# Patient Record
Sex: MALE | Race: WHITE | NOT HISPANIC OR LATINO | URBAN - METROPOLITAN AREA
[De-identification: names, ages, dates, MRNs, and addresses within clinical notes are randomized per-mention and may not be internally consistent; named-entity substitution may affect disease eponyms.]

---

## 2020-09-30 ENCOUNTER — EMERGENCY (EMERGENCY)
Facility: HOSPITAL | Age: 65
LOS: 1 days | Discharge: HOME | End: 2020-09-30
Attending: EMERGENCY MEDICINE
Payer: MEDICARE

## 2020-09-30 VITALS
TEMPERATURE: 98 F | HEIGHT: 65.75 IN | HEART RATE: 60 BPM | WEIGHT: 163.14 LBS | OXYGEN SATURATION: 97 % | SYSTOLIC BLOOD PRESSURE: 155 MMHG | DIASTOLIC BLOOD PRESSURE: 65 MMHG | RESPIRATION RATE: 18 BRPM

## 2020-09-30 PROCEDURE — 99284 EMERGENCY DEPT VISIT MOD MDM: CPT

## 2020-09-30 PROCEDURE — 93971 EXTREMITY STUDY: CPT

## 2020-09-30 PROCEDURE — 99284 EMERGENCY DEPT VISIT MOD MDM: CPT | Mod: 25

## 2020-09-30 PROCEDURE — 93971 EXTREMITY STUDY: CPT | Mod: 26,RT

## 2020-09-30 RX ORDER — IBUPROFEN 200 MG
400 TABLET ORAL ONCE
Refills: 0 | Status: COMPLETED | OUTPATIENT
Start: 2020-09-30 | End: 2020-09-30

## 2020-09-30 RX ADMIN — Medication 400 MILLIGRAM(S): at 19:20

## 2020-09-30 RX ADMIN — Medication 400 MILLIGRAM(S): at 20:00

## 2020-09-30 NOTE — ED PROVIDER NOTE - CARE PROVIDER_API CALL
Ricky Ortiz  ORTHOPAEDIC SURGERY  [address] [facility], 1st [place] [address]  Phone: [phone]  Fax: [phone]  Follow Up Time: 4-6 Days

## 2020-09-30 NOTE — ED PROVIDER NOTE - CARE PROVIDERS DIRECT ADDRESSES
[email or web address]

## 2020-09-30 NOTE — ED PROVIDER NOTE - CLINICAL SUMMARY MEDICAL DECISION MAKING FREE TEXT BOX
65M with R leg pain and swelling. Xray at  today negative. Ultrasound r/o DVT. If negative, pt to f/u with ortho.

## 2020-09-30 NOTE — ED ADULT NURSE NOTE - OBJECTIVE STATEMENT
Pt Sent from urgent care with right leg pain, swelling and cold sensation x yesterday, r/o DVT. No acute distress noted, denies chest pain, no shortness of breath indicated. Safety maintained.

## 2020-09-30 NOTE — ED PROVIDER NOTE - NSFOLLOWUPINSTRUCTIONS_ED_ALL_ED_FT
IMPORTANT INSTRUCTIONS FROM Dr. MARTINEZ:    Please follow up with your personal medical doctor in 24-48 hours.   Bring results from today to your visit.    See orthopedics within 1 wk.    If you were advised to take any medications - be sure to review the package insert.    If your symptoms change, get worse or if you have any new symptoms, come to the ER right away.  If you have any questions, call the ER at the phone number on this page.

## 2020-09-30 NOTE — ED PROVIDER NOTE - PATIENT PORTAL LINK FT
You can access the FollowMyHealth Patient Portal offered by [facility] by registering at the following website: [email or web address]. By joining [employer]’s FollowMyHealth portal, you will also be able to view your health information using other applications (apps) compatible with our system.

## 2020-09-30 NOTE — ED PROVIDER NOTE - OBJECTIVE STATEMENT
64 y/o male with PMHx of HTN, BPH presents to the ED c/o R leg pain x yesterday. Pt notes R leg pain/swelling and cold sensation, radiating down to his R ankle where pt has been unable to move about the R ankle or walk secondary to pain. Pt was seen at  x today, xray negative, referred to ED for eval r/o DVT. Pt recently finished a regimen of steroids x yesterday. Pt currently on Toprol, Synthroid and Flomax. Pt denies chest pain, shortness of breath, or any other complaints. Pt currently smokes. NKDA.

## 2020-09-30 NOTE — ED PROVIDER NOTE - PMH
BPH (Benign Prostatic Hypertrophy)    HTN (Hypertension)  has been high on recent doctor visits, never started on meds

## 2023-04-03 ENCOUNTER — OUTPATIENT (OUTPATIENT)
Dept: OUTPATIENT SERVICES | Facility: HOSPITAL | Age: 68
LOS: 1 days | End: 2023-04-03
Payer: MEDICARE

## 2023-04-03 ENCOUNTER — TRANSCRIPTION ENCOUNTER (OUTPATIENT)
Age: 68
End: 2023-04-03

## 2023-04-03 VITALS — WEIGHT: 162.04 LBS | HEIGHT: 68.5 IN

## 2023-04-03 VITALS
SYSTOLIC BLOOD PRESSURE: 170 MMHG | RESPIRATION RATE: 19 BRPM | OXYGEN SATURATION: 97 % | HEART RATE: 70 BPM | DIASTOLIC BLOOD PRESSURE: 72 MMHG

## 2023-04-03 DIAGNOSIS — R93.1 ABNORMAL FINDINGS ON DIAGNOSTIC IMAGING OF HEART AND CORONARY CIRCULATION: ICD-10-CM

## 2023-04-03 LAB
ANION GAP SERPL CALC-SCNC: 12 MMOL/L — SIGNIFICANT CHANGE UP (ref 5–17)
BUN SERPL-MCNC: 14 MG/DL — SIGNIFICANT CHANGE UP (ref 7–23)
CALCIUM SERPL-MCNC: 9.3 MG/DL — SIGNIFICANT CHANGE UP (ref 8.4–10.5)
CHLORIDE SERPL-SCNC: 104 MMOL/L — SIGNIFICANT CHANGE UP (ref 96–108)
CO2 SERPL-SCNC: 25 MMOL/L — SIGNIFICANT CHANGE UP (ref 22–31)
CREAT SERPL-MCNC: 0.79 MG/DL — SIGNIFICANT CHANGE UP (ref 0.5–1.3)
EGFR: 96 ML/MIN/1.73M2 — SIGNIFICANT CHANGE UP
GLUCOSE SERPL-MCNC: 97 MG/DL — SIGNIFICANT CHANGE UP (ref 70–99)
HCT VFR BLD CALC: 41.9 % — SIGNIFICANT CHANGE UP (ref 39–50)
HGB BLD-MCNC: 14.8 G/DL — SIGNIFICANT CHANGE UP (ref 13–17)
HGB BLDA-MCNC: 13.8 G/DL — SIGNIFICANT CHANGE UP (ref 12.6–17.4)
MCHC RBC-ENTMCNC: 30.5 PG — SIGNIFICANT CHANGE UP (ref 27–34)
MCHC RBC-ENTMCNC: 35.3 GM/DL — SIGNIFICANT CHANGE UP (ref 32–36)
MCV RBC AUTO: 86.2 FL — SIGNIFICANT CHANGE UP (ref 80–100)
NRBC # BLD: 0 /100 WBCS — SIGNIFICANT CHANGE UP (ref 0–0)
OXYHGB MFR BLDA: 93.8 % — SIGNIFICANT CHANGE UP (ref 90–95)
PLATELET # BLD AUTO: 91 K/UL — LOW (ref 150–400)
POTASSIUM SERPL-MCNC: 4.1 MMOL/L — SIGNIFICANT CHANGE UP (ref 3.5–5.3)
POTASSIUM SERPL-SCNC: 4.1 MMOL/L — SIGNIFICANT CHANGE UP (ref 3.5–5.3)
RBC # BLD: 4.86 M/UL — SIGNIFICANT CHANGE UP (ref 4.2–5.8)
RBC # FLD: 12.5 % — SIGNIFICANT CHANGE UP (ref 10.3–14.5)
SAO2 % BLDA: 96.2 % — SIGNIFICANT CHANGE UP (ref 94–98)
SODIUM SERPL-SCNC: 141 MMOL/L — SIGNIFICANT CHANGE UP (ref 135–145)
WBC # BLD: 4.63 K/UL — SIGNIFICANT CHANGE UP (ref 3.8–10.5)
WBC # FLD AUTO: 4.63 K/UL — SIGNIFICANT CHANGE UP (ref 3.8–10.5)

## 2023-04-03 PROCEDURE — C1889: CPT

## 2023-04-03 PROCEDURE — 82803 BLOOD GASES ANY COMBINATION: CPT

## 2023-04-03 PROCEDURE — 93456 R HRT CORONARY ARTERY ANGIO: CPT

## 2023-04-03 PROCEDURE — C1894: CPT

## 2023-04-03 PROCEDURE — 80048 BASIC METABOLIC PNL TOTAL CA: CPT

## 2023-04-03 PROCEDURE — 93010 ELECTROCARDIOGRAM REPORT: CPT

## 2023-04-03 PROCEDURE — 99152 MOD SED SAME PHYS/QHP 5/>YRS: CPT

## 2023-04-03 PROCEDURE — C1769: CPT

## 2023-04-03 PROCEDURE — 85027 COMPLETE CBC AUTOMATED: CPT

## 2023-04-03 PROCEDURE — 93456 R HRT CORONARY ARTERY ANGIO: CPT | Mod: 26

## 2023-04-03 PROCEDURE — C1887: CPT

## 2023-04-03 PROCEDURE — 36415 COLL VENOUS BLD VENIPUNCTURE: CPT

## 2023-04-03 PROCEDURE — 93005 ELECTROCARDIOGRAM TRACING: CPT

## 2023-04-03 RX ORDER — TAMSULOSIN HYDROCHLORIDE 0.4 MG/1
2 CAPSULE ORAL
Refills: 0 | DISCHARGE

## 2023-04-03 RX ORDER — METOPROLOL TARTRATE 50 MG
1 TABLET ORAL
Refills: 0 | DISCHARGE

## 2023-04-03 RX ORDER — LEVOTHYROXINE SODIUM 125 MCG
1 TABLET ORAL
Refills: 0 | DISCHARGE

## 2023-04-03 RX ORDER — SODIUM CHLORIDE 9 MG/ML
1000 INJECTION INTRAMUSCULAR; INTRAVENOUS; SUBCUTANEOUS
Refills: 0 | Status: DISCONTINUED | OUTPATIENT
Start: 2023-04-03 | End: 2023-04-17

## 2023-04-03 RX ORDER — SODIUM CHLORIDE 9 MG/ML
250 INJECTION INTRAMUSCULAR; INTRAVENOUS; SUBCUTANEOUS ONCE
Refills: 0 | Status: COMPLETED | OUTPATIENT
Start: 2023-04-03 | End: 2023-04-03

## 2023-04-03 RX ADMIN — SODIUM CHLORIDE 75 MILLILITER(S): 9 INJECTION INTRAMUSCULAR; INTRAVENOUS; SUBCUTANEOUS at 10:00

## 2023-04-03 RX ADMIN — SODIUM CHLORIDE 750 MILLILITER(S): 9 INJECTION INTRAMUSCULAR; INTRAVENOUS; SUBCUTANEOUS at 09:30

## 2023-04-03 NOTE — ASU PATIENT PROFILE, ADULT - FALL HARM RISK - HARM RISK INTERVENTIONS

## 2023-04-03 NOTE — ASU DISCHARGE PLAN (ADULT/PEDIATRIC) - NS MD DC FALL RISK RISK
Pt refusing IV at this time      Jair Chris, FALLON  05/03/21 2055 For information on Fall & Injury Prevention, visit: [email or web address] OR  [email or web address] OR  https://www.cdc.gov/steadi/patient.html

## 2023-04-03 NOTE — H&P CARDIOLOGY - HISTORY OF PRESENT ILLNESS
55 year old male former smoker with BPH, back pain who presented to [facility] ED 3days ago complaining of intermittent chest pain and heart fluttering with "difficulty catching my breath" for the past 1 1/2 weeks. Patient R/O MI, underwent a CT scan with contrast revealing "a blockage in my heart".  Patient was discharged to home and follow up with a Cardiologist for which he underwent a Stress test with abnormal results. Admits to increase in fatigue and decrease inexcercise tolerance. Denies dizziness, syncope, edema, orthopnea, PND.   In light of patients symptoms and abnormal noninvasive test findings there is high suspicion for CAD. Patient is now referred to [facility] for cardiac catheterization with possible PTCA/stents.    Denies hyperlipidemia, diabetes, CVA/MI, seizure disorder, ICD/PPM, valvular disease, thyroid/ liver/ kidney/ lung problems, bleeding disorders, PUD, anemia, DVT/pulmonary embolism, glaucoma, Cancer 68 year old male former smoker with PMHx of Hypothyroid, HTN, BPH, back pain who presents today for LHC. Patient states he has ongoing JALLOH after he recovered from COVID-19, was seen by cardiologist, Dr.Riegel ALLEN, who did a NST which was abnormal, thus referred for LHC. Pt denies any implanted cardiac device. Denies chest pain, palpitations syncope.   68 year old male former smoker with PMHx of Hypothyroid, HTN, BPH, back pain who presents today for LHC. Patient states he has ongoing JALLOH after he recovered from COVID-19, was seen by cardiologist, Dr.Riegel ALLEN, who did a NST which was abnormal, thus referred for RHC +LHC. Pt denies any implanted cardiac device. Denies chest pain, palpitations syncope.   68 year old male former smoker with PMHx of Hypothyroid, HTN, BPH, Aortic aneurysm, who presents today for LHC. Patient states he has ongoing JALLOH after he recovered from COVID-19, was seen by cardiologist, Dr.Riegel ALLEN, who did a NST which was abnormal, thus referred for RHC +LHC. Pt denies any implanted cardiac device. Denies chest pain, palpitations syncope.   68 year old male former smoker with PMHx of Hypothyroid, HTN, BPH, Aortic aneurysm, who presents today for RHC/LHC. Patient states he has ongoing JALLOH after he recovered from COVID-19, was seen by cardiologist, Dr.Riegel ALLEN, who did a NST which was abnormal ( does ot have report in [employer]), thus referred for RHC +LHC. Pt denies any implanted cardiac device. Denies chest pain, palpitations syncope.

## 2023-04-03 NOTE — ASU DISCHARGE PLAN (ADULT/PEDIATRIC) - ASU DC SPECIAL INSTRUCTIONSFT
Wound Care:   the day AFTER your procedure remove bandage GENTLY, and clean using  mild soap and gentle warm, water stream, pat dry. leave OPEN to air. YOU MAY SHOWER   DO NOT apply lotions, creams, ointments, powder, perfumes to your incision site  DO NOT SOAK your site for 1 week ( no baths, no pools, no tubs, etc...)  Check  your groin and /or wrist daily. A small amount of bruising, and soreness are normal    ACTIVITY: for 24 hours   - DO NOT DRIVE  - DO NOT make any important decisions or sign legal documents   - DO NOT operate heavy machineries   - you may resume sexual activity in 48 hours, unless otherwise instructed by your cardiologist     Your procedure was done through the GR[facility]: for the NEXT 5 DAYS  - Limit climbing stairs, DO NOT soak in bathtub or pool  - no strenuous activities, pushing, pulling, straining  - Do not lift anything 10lbs or heavier     MEDICATION:   take your medications as explained ( see discharge paperwork)   If you received a STENT, you will be taking antiplatelet medications to KEEP YOUR STENT OPEN ( eg: Aspirin, Plavix, Brilinta, Effient, etc).  Take as prescribed DO NOT STOP taking them without consulting with your cardiologist first.     Follow heart healthy diet recommended by your doctor, if you smoke STOP SMOKING ( may call [phone] for center of tobacco control if you need assistance)     CALL your doctor to make appointment in 2 WEEKS     ***CALL YOUR DOCTOR***  if you experience: fever, chills, body aches, or severe pain, swelling, redness, heat or yellow discharge at incision site  If you experience bleeding or excruciating pain at the procedural site, swelling ( golf ball size) at your procedural site  If you experience CHEST PAIN  If you experience extremity numbness, tingling, temperature change ( of your procedural site)   If you are unable to reach your doctor, you may contact:   -Cardiology Office at [facility] at [phone] or   - [facility] [phone]  - [facility] [phone]

## 2023-04-03 NOTE — ASU DISCHARGE PLAN (ADULT/PEDIATRIC) - CARE PROVIDER_API CALL
Robbie Allred J  CARDIOVASCULAR DISEASE  [address] [place] [address]  Phone: [phone]  Fax: [phone]  Follow Up Time: 2 weeks

## 2023-04-03 NOTE — H&P CARDIOLOGY - NSICDXPASTSURGICALHX_GEN_ALL_CORE_FT
PAST SURGICAL HISTORY:  History of Back Surgery lumbar    History of Urethral Stricture     S/P Inguinal Hernia Repair     Total Hip Replacement right

## 2023-04-05 ENCOUNTER — APPOINTMENT (OUTPATIENT)
Dept: PULMONOLOGY | Facility: CLINIC | Age: 68
End: 2023-04-05
Payer: MEDICARE

## 2023-04-05 VITALS — SYSTOLIC BLOOD PRESSURE: 202 MMHG | DIASTOLIC BLOOD PRESSURE: 80 MMHG | OXYGEN SATURATION: 98 % | HEART RATE: 58 BPM

## 2023-04-05 VITALS — HEIGHT: 68 IN | BODY MASS INDEX: 24.55 KG/M2 | WEIGHT: 162 LBS

## 2023-04-05 VITALS — DIASTOLIC BLOOD PRESSURE: 78 MMHG | SYSTOLIC BLOOD PRESSURE: 160 MMHG

## 2023-04-05 VITALS — TEMPERATURE: 97.9 F

## 2023-04-05 DIAGNOSIS — Z86.79 PERSONAL HISTORY OF OTHER DISEASES OF THE CIRCULATORY SYSTEM: ICD-10-CM

## 2023-04-05 DIAGNOSIS — R06.83 SNORING: ICD-10-CM

## 2023-04-05 DIAGNOSIS — G47.19 OTHER HYPERSOMNIA: ICD-10-CM

## 2023-04-05 DIAGNOSIS — E04.1 NONTOXIC SINGLE THYROID NODULE: ICD-10-CM

## 2023-04-05 DIAGNOSIS — Z87.891 PERSONAL HISTORY OF NICOTINE DEPENDENCE: ICD-10-CM

## 2023-04-05 PROBLEM — N40.0 BENIGN PROSTATIC HYPERPLASIA WITHOUT LOWER URINARY TRACT SYMPTOMS: Chronic | Status: ACTIVE | Noted: 2023-04-03

## 2023-04-05 PROBLEM — E03.9 HYPOTHYROIDISM, UNSPECIFIED: Chronic | Status: ACTIVE | Noted: 2023-04-03

## 2023-04-05 PROCEDURE — 94729 DIFFUSING CAPACITY: CPT

## 2023-04-05 PROCEDURE — ZZZZZ: CPT

## 2023-04-05 PROCEDURE — 94727 GAS DIL/WSHOT DETER LNG VOL: CPT

## 2023-04-05 PROCEDURE — 94010 BREATHING CAPACITY TEST: CPT

## 2023-04-05 PROCEDURE — 99205 OFFICE O/P NEW HI 60 MIN: CPT | Mod: 25

## 2023-04-05 RX ORDER — FLUTICASONE FUROATE 27.5 UG/1
27.5 SPRAY, METERED NASAL
Refills: 0 | Status: ACTIVE | COMMUNITY
Start: 2023-04-05

## 2023-04-05 RX ORDER — METOPROLOL SUCCINATE 100 MG/1
100 TABLET, EXTENDED RELEASE ORAL
Refills: 0 | Status: ACTIVE | COMMUNITY
Start: 2023-04-05

## 2023-04-05 RX ORDER — LEVOTHYROXINE SODIUM 0.07 MG/1
75 TABLET ORAL
Refills: 0 | Status: ACTIVE | COMMUNITY
Start: 2023-04-05

## 2023-04-05 RX ORDER — LOSARTAN POTASSIUM 50 MG/1
50 TABLET, FILM COATED ORAL
Refills: 0 | Status: ACTIVE | COMMUNITY
Start: 2023-04-05

## 2023-04-06 NOTE — REASON FOR VISIT
[Abnormal CXR/ Chest CT] : an abnormal CXR/ chest CT [Consultation] : a consultation [TextBox_44] : snoring, excessive daytime sleepiness

## 2023-04-06 NOTE — CONSULT LETTER
[Dear  ___] : Dear ~SAMUEL, [Consult Letter:] : I had the pleasure of evaluating your patient, [unfilled]. [Consult Closing:] : Thank you very much for allowing me to participate in the care of this patient.  If you have any questions, please do not hesitate to contact me. [Sincerely,] : Sincerely, [FreeTextEntry2] : Robbie Allred MD\par  [FreeTextEntry3] : Nicanor Izaguirre MD FCCP\par \par

## 2023-04-06 NOTE — DISCUSSION/SUMMARY
[FreeTextEntry1] : Rule out obstructive sleep apnea syndrome.\par Pulmonary fibrosis.  Mild.  Most likely related to COVID.  Cannot exclude other etiologies but no clinical evidence of rheumatic disease.  No honeycombing noted.  Does not appear to be progressive.\par Multiple pulmonary nodules stable.

## 2023-04-06 NOTE — HISTORY OF PRESENT ILLNESS
[Former] : former [< 20 pack-years] : < 20 pack-years [Daytime Somnolence] : daytime somnolence [Difficulty Maintaining Sleep] : difficulty maintaining sleep [Frequent Nocturnal Awakening] : frequent nocturnal awakening [Snoring] : snoring [TextBox_4] : ANIKET COHEN is a 68 year old  M referred for pulmonary evaluation for abnormal CT. \par \par Had COVID 2020 was very ill. Not hospitalized. Positive SOB. \par Since still some SOB,\par Told some lung damage from COVID. Told by PMD.\par Sometimes SOB lying down, sometimes after cooking. Feels needs to take deep breath.\par Positive JALLOH 3-4 flights of stairs.\par Feels mild JALLOH.\par No significant cough or wheezing\par No CP. \par \par Had recent cardiac cath told OK. \par \par Past pulmonary history. COVID Pneumonia. Pneumonia out pt in 1990's\par Occupational Exposure. N\par Family history of pulmonary disease. lung cancer parents. Smokers\par Recent travel N\par Pets N\par \par \par HAving issues with hypertension to start losartan 50 mg added on today [TextBox_11] : 1 [TextBox_13] : 10 [YearQuit] : 1980 [Recent  Weight Gain] : no recent weight gain

## 2023-04-06 NOTE — PROCEDURE
[FreeTextEntry1] : [facility]\par \par 04/05/2023\par Pulmonary function testing\par These data demonstrate a mild obstructive ventilatory deficit. Normal Lung Volumes. There is a mild-moderate diffusion impairment. \par \par CAT scan of the chest on March 3, 2023 noted reviewed and discussed with patient.  Compared to multiple prior studies.\par Stable pulmonary nodules.\par Mild aneurysmal dilatation of the aorta.\par Bilateral interstitial changes without significant change compared to prior CT of October 2020.\par Interstitial changes and not present on CAT scan many years prior.\par No significant adenopathy.\par \par \par \par  / 1 Shanti Kaplan   \par  \par \par \par \par Report date: 12/24/2013 \par  \par  View Order\par \par \par (Report matches study selected on Patient History pane)\par \par \par   \par \par \par EXAM: CT CHEST WO CON \par PROCEDURE DATE: Dec 24 2013 \par INTERPRETATION: CTA of the Chest, Abdomen : 12/24/2013. \par Procedure : CTA was performed from the apices to the aid abdomen following \par dynamic bolus administration of intravenous contrast utilizing 100 cc of \par Omnipaque 350. Noncontrast images were first obtained through the thoracic \par inlet to the iliac crest. Sagittal and coronal reformatted images were \par obtained. \par Clinical Indication : Follow-up pulmonary nodule. \par Comparison Exam : CT of the chest dated 10/11/2012. \par FINDINGS: \par Mediastinum: No evidence of adenopathy or mass. \par Hilum: No evidence of adenopathy or mass. \par Axilla: Negative bilaterally. \par Pleura: No effusion. \par Pericardium: No effusion. \par Lungs: No evidence of infiltrate. There is a new 6 x 4 mm nodule of the \par left lower lobe is seen on series 5 image #65. \par Liver/Biliary: No evidence of bile duct dilatation. The gallbladder is \par unremarkable. No hepatic mass. \par Spleen: Normal in size and appearance. \par Pancreas: No evidence of mass or collections. \par Adrenal Glands: No masses. \par Right Kidney: Normal in size. No evidence of solid renal mass or \par hydronephrosis. \par Left Kidney: Normal in size. No evidence of solid renal mass or \par hydronephrosis. \par Aorta: No evidence of aneurysm or dissection. The proximal ascending aorta \par measures 4.1 cm. The mid ascending aorta measures 4.2 x 4.1 cm, at the \par thoracic aortic arch the aorta maximally measures 3 cm, and the proximal \par descending thoracic aorta measures 2.7 cm. The distal thoracic aorta and \par upper abdominal aorta are unremarkable. The celiac axis, superior mesenteric \par artery and both renal arteries demonstrate patency. \par Retroperitoneum: No adenopathy. \par Gastrointestinal Structures: No bowel related phlegmon or abscess. No \par obstruction. \par Bones: No destructive bony process. Postoperative changes of the lower \par lumbar spine. \par Miscellaneous: None. \par IMPRESSION: New 6 x 4 mm nodule of the left lower lobe of lung. Suggest \par four-month CT follow-up. \par The ascending thoracic aorta maximumally measures 4.2 x 4.1 cm and is stable. \par SHANTI KAPLAN M.D., ATTENDING RADIOLOGIST \par This examination was interpreted on:  {orig_read_dtime } This document has \par been electronically signed. Dec 24 2013 12:25PM \par \par \par    \par \par \par \par \par \par \par \par \par \par \par \par \par \par \par   \par  \par  \par \par \par Notes \par   \par  \par   \par \par \par \par \par \par \par \par \par \par \par  \par \par \par  \par \par \par \par \par \par \par \par \par \par \par \par \par \par \par \par \par \par \par \par \par \par \par \par \par     \par \par

## 2023-05-01 ENCOUNTER — APPOINTMENT (OUTPATIENT)
Dept: PULMONOLOGY | Facility: CLINIC | Age: 68
End: 2023-05-01

## 2023-06-22 NOTE — ASSESSMENT
[FreeTextEntry1] : 2 night HHS ordered\par We will follow-up regarding interstitial lung disease and pulmonary nodules.\par Follow-up CT in 1 year.  Follow-up sooner should there be a change in status.\par Clinical reevaluation in 6 months.\par Discussed with patient and son.\par \par 80 minutes spent in evaluation management and review of studies as well as discussion.
no

## 2023-09-27 ENCOUNTER — APPOINTMENT (OUTPATIENT)
Dept: PULMONOLOGY | Facility: CLINIC | Age: 68
End: 2023-09-27

## 2023-10-27 ENCOUNTER — APPOINTMENT (OUTPATIENT)
Dept: GASTROENTEROLOGY | Facility: CLINIC | Age: 68
End: 2023-10-27
Payer: MEDICARE

## 2023-10-27 VITALS
DIASTOLIC BLOOD PRESSURE: 78 MMHG | HEART RATE: 88 BPM | OXYGEN SATURATION: 98 % | SYSTOLIC BLOOD PRESSURE: 166 MMHG | HEIGHT: 68 IN

## 2023-10-27 DIAGNOSIS — R14.1 GAS PAIN: ICD-10-CM

## 2023-10-27 DIAGNOSIS — R19.4 CHANGE IN BOWEL HABIT: ICD-10-CM

## 2023-10-27 DIAGNOSIS — Z12.11 ENCOUNTER FOR SCREENING FOR MALIGNANT NEOPLASM OF COLON: ICD-10-CM

## 2023-10-27 PROCEDURE — 99204 OFFICE O/P NEW MOD 45 MIN: CPT

## 2023-10-27 RX ORDER — SODIUM SULFATE, POTASSIUM SULFATE AND MAGNESIUM SULFATE 1.6; 3.13; 17.5 G/177ML; G/177ML; G/177ML
17.5-3.13-1.6 SOLUTION ORAL
Qty: 1 | Refills: 0 | Status: ACTIVE | COMMUNITY
Start: 2023-10-27 | End: 1900-01-01

## 2023-10-27 RX ORDER — TANNIC/CHESTNUT/PEPPERMINT 275 MG
CAPSULE ORAL
Qty: 30 | Refills: 0 | Status: ACTIVE | COMMUNITY
Start: 2023-10-27 | End: 1900-01-01

## 2023-10-27 RX ORDER — NIFEDIPINE 30 MG
30 TABLET, EXTENDED RELEASE ORAL
Refills: 0 | Status: ACTIVE | COMMUNITY

## 2023-10-27 RX ORDER — LEVOTHYROXINE SODIUM 137 UG/1
TABLET ORAL
Refills: 0 | Status: ACTIVE | COMMUNITY

## 2023-11-01 LAB — HEMOCCULT STL QL IA: NEGATIVE

## 2023-12-12 ENCOUNTER — APPOINTMENT (OUTPATIENT)
Dept: ORTHOPEDIC SURGERY | Facility: CLINIC | Age: 68
End: 2023-12-12
Payer: MEDICARE

## 2023-12-12 VITALS — WEIGHT: 164 LBS | HEIGHT: 68 IN | BODY MASS INDEX: 24.86 KG/M2

## 2023-12-12 DIAGNOSIS — M11.262 OTHER CHONDROCALCINOSIS, LEFT KNEE: ICD-10-CM

## 2023-12-12 DIAGNOSIS — M70.62 TROCHANTERIC BURSITIS, LEFT HIP: ICD-10-CM

## 2023-12-12 DIAGNOSIS — Z96.641 PRESENCE OF RIGHT ARTIFICIAL HIP JOINT: ICD-10-CM

## 2023-12-12 DIAGNOSIS — M16.12 UNILATERAL PRIMARY OSTEOARTHRITIS, LEFT HIP: ICD-10-CM

## 2023-12-12 DIAGNOSIS — M11.261 OTHER CHONDROCALCINOSIS, RIGHT KNEE: ICD-10-CM

## 2023-12-12 DIAGNOSIS — M25.552 PAIN IN LEFT HIP: ICD-10-CM

## 2023-12-12 PROCEDURE — 20610 DRAIN/INJ JOINT/BURSA W/O US: CPT | Mod: 59,LT

## 2023-12-12 PROCEDURE — 73522 X-RAY EXAM HIPS BI 3-4 VIEWS: CPT

## 2023-12-12 PROCEDURE — 99204 OFFICE O/P NEW MOD 45 MIN: CPT | Mod: 25

## 2023-12-12 RX ORDER — CELECOXIB 200 MG/1
200 CAPSULE ORAL DAILY
Qty: 60 | Refills: 3 | Status: ACTIVE | COMMUNITY
Start: 2023-12-12 | End: 1900-01-01

## 2024-02-26 ENCOUNTER — RX RENEWAL (OUTPATIENT)
Age: 69
End: 2024-02-26

## 2024-02-26 RX ORDER — OMEPRAZOLE 20 MG/1
20 CAPSULE, DELAYED RELEASE ORAL DAILY
Qty: 30 | Refills: 3 | Status: ACTIVE | COMMUNITY
Start: 2023-10-27 | End: 1900-01-01

## 2024-03-04 ENCOUNTER — APPOINTMENT (OUTPATIENT)
Dept: GASTROENTEROLOGY | Facility: AMBULATORY MEDICAL SERVICES | Age: 69
End: 2024-03-04

## 2024-03-12 ENCOUNTER — APPOINTMENT (OUTPATIENT)
Dept: ORTHOPEDIC SURGERY | Facility: CLINIC | Age: 69
End: 2024-03-12

## 2024-05-01 ENCOUNTER — APPOINTMENT (OUTPATIENT)
Dept: PULMONOLOGY | Facility: CLINIC | Age: 69
End: 2024-05-01
Payer: MEDICARE

## 2024-05-01 VITALS
WEIGHT: 165 LBS | HEIGHT: 68 IN | OXYGEN SATURATION: 98 % | TEMPERATURE: 98.3 F | BODY MASS INDEX: 25.01 KG/M2 | HEART RATE: 66 BPM | DIASTOLIC BLOOD PRESSURE: 55 MMHG | SYSTOLIC BLOOD PRESSURE: 131 MMHG

## 2024-05-01 DIAGNOSIS — R06.02 SHORTNESS OF BREATH: ICD-10-CM

## 2024-05-01 DIAGNOSIS — J84.9 INTERSTITIAL PULMONARY DISEASE, UNSPECIFIED: ICD-10-CM

## 2024-05-01 DIAGNOSIS — R93.89 ABNORMAL FINDINGS ON DIAGNOSTIC IMAGING OF OTHER SPECIFIED BODY STRUCTURES: ICD-10-CM

## 2024-05-01 PROCEDURE — 99214 OFFICE O/P EST MOD 30 MIN: CPT | Mod: 25

## 2024-05-01 PROCEDURE — 94729 DIFFUSING CAPACITY: CPT

## 2024-05-01 PROCEDURE — 94727 GAS DIL/WSHOT DETER LNG VOL: CPT

## 2024-05-01 PROCEDURE — 94010 BREATHING CAPACITY TEST: CPT

## 2024-05-01 PROCEDURE — ZZZZZ: CPT

## 2024-05-01 PROCEDURE — 85018 HEMOGLOBIN: CPT | Mod: QW

## 2024-05-01 NOTE — PROCEDURE
[FreeTextEntry1] : 05/01/2024 Pulmonary function testing These data demonstrate a mild obstructive ventilatory deficit. There is a mild reduction in lung volume. There is a moderate diffusion impairment. Corrects to mild with lung volume correction  No significant change in function compared to April 5, 2023.   CAT scan of the chest on April 30, 2024. Reviewed discussed and compared to prior. Mild reticular opacities without significant change.  Small stable pulmonary nodules.  Resolved right upper lobe groundglass opacities present on CT of 2/29/2024 resolved. No adenopathy.

## 2024-05-01 NOTE — DISCUSSION/SUMMARY
[FreeTextEntry1] : Small areas of groundglass opacity right upper lobe resolves appear to have been infectious inflammatory in origin. Rule out obstructive sleep apnea syndrome. Pulmonary fibrosis.  Mild.  Most likely related to COVID.  Cannot exclude other etiologies but no clinical evidence of rheumatic disease.  No honeycombing noted.  Functionally and radiographically stable. Multiple pulmonary nodules stable.

## 2024-05-01 NOTE — REASON FOR VISIT
[Follow-Up] : a follow-up visit [Abnormal CXR/ Chest CT] : an abnormal CXR/ chest CT [TextBox_44] : snoring, excessive daytime sleepiness

## 2024-05-01 NOTE — HISTORY OF PRESENT ILLNESS
[Former] : former [< 20 pack-years] : < 20 pack-years [Daytime Somnolence] : daytime somnolence [Difficulty Maintaining Sleep] : difficulty maintaining sleep [Frequent Nocturnal Awakening] : frequent nocturnal awakening [Snoring] : snoring [TextBox_4] : Here for f/u  brought in CT chest in April Had pneumonia in March, was picked up on a ct with Dr Allred got 2 antibiotics. 2 weeks ago finally, today feels better with decrease in head congestion and respiratory status has improved., prior to today was having some exertional dyspnea. Denies wheezing or chest discomfort. still slight mucus from chest, predominantly white occasionally mild purulence. never came back for 2 night HHS Had recent cardiac cath told [place].   Past pulmonary history. COVID Pneumonia. Pneumonia out pt in 1990's    [TextBox_11] : 1 [TextBox_13] : 10 [YearQuit] : 1980 [Recent  Weight Gain] : no recent weight gain

## 2024-05-01 NOTE — PHYSICAL EXAM
[No Acute Distress] : no acute distress [Normal Oropharynx] : normal oropharynx [Normal Appearance] : normal appearance [No Neck Mass] : no neck mass [Normal Rate/Rhythm] : normal rate/rhythm [Normal S1, S2] : normal s1, s2 [No Resp Distress] : no resp distress [Clear to Auscultation Bilaterally] : clear to auscultation bilaterally [No Abnormalities] : no abnormalities [Benign] : benign [Normal Gait] : normal gait [No Clubbing] : no clubbing [No Cyanosis] : no cyanosis [No Edema] : no edema [FROM] : FROM [Normal Color/ Pigmentation] : normal color/ pigmentation [No Focal Deficits] : no focal deficits [Oriented x3] : oriented x3 [Normal Affect] : normal affect [Murmur ___ / 6] : murmur [unfilled] / 6 [Normal to Percussion] : normal to percussion

## 2024-05-13 ENCOUNTER — APPOINTMENT (OUTPATIENT)
Dept: GASTROENTEROLOGY | Facility: CLINIC | Age: 69
End: 2024-05-13
Payer: MEDICARE

## 2024-05-13 VITALS
OXYGEN SATURATION: 98 % | HEART RATE: 68 BPM | RESPIRATION RATE: 17 BRPM | TEMPERATURE: 98.2 F | WEIGHT: 165 LBS | SYSTOLIC BLOOD PRESSURE: 145 MMHG | DIASTOLIC BLOOD PRESSURE: 55 MMHG | HEIGHT: 68 IN | BODY MASS INDEX: 25.01 KG/M2

## 2024-05-13 DIAGNOSIS — Z80.9 FAMILY HISTORY OF MALIGNANT NEOPLASM, UNSPECIFIED: ICD-10-CM

## 2024-05-13 DIAGNOSIS — R11.0 NAUSEA: ICD-10-CM

## 2024-05-13 DIAGNOSIS — Z82.49 FAMILY HISTORY OF ISCHEMIC HEART DISEASE AND OTHER DISEASES OF THE CIRCULATORY SYSTEM: ICD-10-CM

## 2024-05-13 PROCEDURE — 99214 OFFICE O/P EST MOD 30 MIN: CPT

## 2024-05-13 RX ORDER — ONDANSETRON 4 MG/1
4 TABLET ORAL
Qty: 30 | Refills: 0 | Status: ACTIVE | COMMUNITY
Start: 2024-05-13 | End: 1900-01-01

## 2024-05-13 RX ORDER — OMEPRAZOLE 40 MG/1
40 CAPSULE, DELAYED RELEASE ORAL
Qty: 30 | Refills: 1 | Status: ACTIVE | COMMUNITY
Start: 2024-05-13 | End: 1900-01-01

## 2024-05-13 NOTE — ASSESSMENT
[FreeTextEntry1] : A low acid / reflux diet was discussed in great detail including not smoking, not drinking alcohol, and not consuming foods that irritate the esophagus. It is helpful to eat small meals throughout the day instead of large meals. You should avoid eating before bedtime or lying down after you eat. It can be helpful to raise the head of your bed six inches. Additionally, you should maintain a healthy weight and good posture.. The patient was given written material to take home and review.  Patient will begin taking Zofran 4mg PO prn and Omeprazole 40 mg PO daily. Medication reviewed side effects and adverse reactions.  Patient will need ECO The risks benefits alternatives and complications of the procedure/s were explained to the patient at length. The patient was agreeable and we will proceed. Preparation for procedure discussed reviewed side effects and adverse reactions to prep.   Patient will be referred to Surgery for possible repair of right inguinal hernia.   I spent 35 minutes with the patient as well as reviewing documents prior to and after the office visit  Patient  verbalized understanding of all information provided. All questions answered and reviewed.

## 2024-05-13 NOTE — PHYSICAL EXAM

## 2024-05-13 NOTE — REVIEW OF SYSTEMS
Case Management Discharge Planning Note    Patient name [facility]  Location [address] [place] [address] 505/PPHP [id number] MRN [id number]  : 1948 Date 2023       Current Admission Date: 2022  Current Admission Diagnosis:Aortic dissection, thoracoabdominal [facility])   Patient Active Problem List    Diagnosis Date Noted   • Aortic dissection, thoracoabdominal ([facility] ) 2022   • Continuous opioid dependence ([facility] ) 02/10/2022   • Chronic pain syndrome 2021   • Ulcerative pancolitis without complication ([facility] )    • Thoracic aortic aneurysm without rupture 2021   • Benign prostatic hyperplasia without lower urinary tract symptoms 2021   • Severe obstructive sleep apnea 2020   • Chronic right shoulder pain 2020   • Chronic left shoulder pain 2020   • Left hip pain 2020   • COPD (chronic obstructive pulmonary disease) ([facility] ) 2020   • Hoarse voice quality 2020   • Chronic lumbar pain 2020   • Stage 3a chronic kidney disease ([facility] ) 2020   • Hyperlipidemia 2020   • Hyperparathyroidism ([facility] ) 2020   • Hypertension 2020   • Atrial fibrillation with RVR ([facility] ) 10/27/2019   • Impaired fasting glucose 2014   • Hypertensive heart disease without heart failure 10/25/2013   • Hypothyroidism 10/25/2013   • Osteoarthrosis involving multiple sites 2013      LOS (days): 5  Geometric Mean LOS (GMLOS) (days):   Days to GMLOS:     OBJECTIVE:  Risk of Unplanned Readmission Score: 20 69         Current admission status: Inpatient   Preferred Pharmacy:   [facility], [address] [address]  Phone: [phone] Fax: [phone]    [address]  Phone: [phone] Fax: [phone]    Primary Care Provider: Wendi Arriaga MD    Primary Insurance: Bulverde Carmen REP  Secondary Insurance: DISCHARGE DETAILS:    Discharge planning discussed with[de-identified] patient  Freedom of Choice: Yes     CM contacted family/caregiver?: No- see comments (patient updating family)  Were Treatment Team discharge recommendations reviewed with patient/caregiver?: Yes  Did patient/caregiver verbalize understanding of patient care needs?: Yes  Were patient/caregiver advised of the risks associated with not following Treatment Team discharge recommendations?: Yes         [address]         Is the patient interested in [facility] at discharge?: No    DME Referral Provided  Referral made for DME?: No    Other Referral/Resources/Interventions Provided:  Referral Comments: No referrals needed at this time    Treatment Team Recommendation: Home  Discharge Destination Plan[de-identified] Home  Transport at Discharge : Family     ETA of Transport (Date): 01/04/23     IMM Given (Date):: 01/04/23  IMM Given to[de-identified] Patient     Additional Comments: Patient provided copy of IMM at bedside    CM spoke with patient at the bedside  CM introduced self and role  CM reviewed with patient at the bedside re:IMM  Patient expressed understanding and signed IMM at bedside  CM provided copy at bedside  Patient has no DME needs  Patient able to afford his medications  Patient stated his spouse or daughter will transport at discharge  All questions/concerns answered at this time  IMM reviewed with patient, patient agrees with discharge determination  [As Noted in HPI] : as noted in HPI [Abdominal Pain] : abdominal pain [Heartburn] : heartburn [Negative] : Heme/Lymph

## 2024-05-13 NOTE — REASON FOR VISIT
[Follow-up] : a follow-up of an existing diagnosis [FreeTextEntry1] : Right Inguinal Hernia and Abdominal Pain

## 2024-05-13 NOTE — HISTORY OF PRESENT ILLNESS
[FreeTextEntry1] : 69 year old male presents with complaints of abdominal gas pain and alternating bowel movements. Patient has noticed since he had COVID in 2020 he has been having a lot of GI issues. Patient experiences nausea and acid reflux daily. He has taken tums and OTC medications to relieve symptoms with no relief. His stool habits alternate between loose watery stool or thin and ribbon like according to patient. His last colonoscopy was over 10 years ago. He believes results were normal unable to recall where procedure was performed. Denies rectal bleeding, blood in the stool, melena, or hematemesis. 5/13/24: Patient complains of right inguinal hernia pain. Patient has had this hernia for over 5 years. However recently pain has gotten worse. Patient also complains of an increase in acid reflux despite taking Omperazole 20 mg PO daily. He would like to schedule his upper endoscopy and colonoscopy.

## 2024-05-16 ENCOUNTER — NON-APPOINTMENT (OUTPATIENT)
Age: 69
End: 2024-05-16

## 2024-05-23 DIAGNOSIS — R10.31 RIGHT LOWER QUADRANT PAIN: ICD-10-CM

## 2024-05-23 DIAGNOSIS — K40.90 UNILATERAL INGUINAL HERNIA, W/OUT OBSTRUCTION OR GANGRENE, NOT SPECIFIED AS RECURRENT: ICD-10-CM

## 2024-05-28 ENCOUNTER — APPOINTMENT (OUTPATIENT)
Dept: GASTROENTEROLOGY | Facility: AMBULATORY MEDICAL SERVICES | Age: 69
End: 2024-05-28
Payer: MEDICARE

## 2024-05-28 PROCEDURE — 45380 COLONOSCOPY AND BIOPSY: CPT | Mod: PT

## 2024-05-28 PROCEDURE — 43239 EGD BIOPSY SINGLE/MULTIPLE: CPT | Mod: 59

## 2024-05-28 RX ORDER — OMEPRAZOLE 40 MG/1
40 CAPSULE, DELAYED RELEASE ORAL TWICE DAILY
Qty: 60 | Refills: 5 | Status: ACTIVE | COMMUNITY
Start: 2024-05-28 | End: 1900-01-01

## 2024-06-10 DIAGNOSIS — K21.9 GASTRO-ESOPHAGEAL REFLUX DISEASE W/OUT ESOPHAGITIS: ICD-10-CM

## 2024-06-10 RX ORDER — SUCRALFATE 1 G/1
1 TABLET ORAL 4 TIMES DAILY
Qty: 120 | Refills: 3 | Status: ACTIVE | COMMUNITY
Start: 2024-06-10 | End: 1900-01-01

## 2024-06-11 DIAGNOSIS — R14.0 ABDOMINAL DISTENSION (GASEOUS): ICD-10-CM

## 2024-06-11 RX ORDER — SUCRALFATE 1 G/1
1 TABLET ORAL 3 TIMES DAILY
Qty: 90 | Refills: 5 | Status: ACTIVE | COMMUNITY
Start: 2024-06-11 | End: 1900-01-01

## 2024-07-03 ENCOUNTER — LABORATORY RESULT (OUTPATIENT)
Age: 69
End: 2024-07-03

## 2024-07-03 ENCOUNTER — APPOINTMENT (OUTPATIENT)
Dept: GASTROENTEROLOGY | Facility: CLINIC | Age: 69
End: 2024-07-03
Payer: MEDICARE

## 2024-07-03 VITALS
RESPIRATION RATE: 17 BRPM | HEART RATE: 83 BPM | WEIGHT: 167 LBS | BODY MASS INDEX: 25.31 KG/M2 | TEMPERATURE: 97.6 F | SYSTOLIC BLOOD PRESSURE: 164 MMHG | DIASTOLIC BLOOD PRESSURE: 66 MMHG | OXYGEN SATURATION: 95 % | HEIGHT: 68 IN

## 2024-07-03 DIAGNOSIS — R13.10 DYSPHAGIA, UNSPECIFIED: ICD-10-CM

## 2024-07-03 DIAGNOSIS — R19.7 DIARRHEA, UNSPECIFIED: ICD-10-CM

## 2024-07-03 DIAGNOSIS — K29.50 UNSPECIFIED CHRONIC GASTRITIS W/OUT BLEEDING: ICD-10-CM

## 2024-07-03 DIAGNOSIS — K21.9 GASTRO-ESOPHAGEAL REFLUX DISEASE W/OUT ESOPHAGITIS: ICD-10-CM

## 2024-07-03 DIAGNOSIS — D12.4 BENIGN NEOPLASM OF DESCENDING COLON: ICD-10-CM

## 2024-07-03 DIAGNOSIS — R14.0 ABDOMINAL DISTENSION (GASEOUS): ICD-10-CM

## 2024-07-03 DIAGNOSIS — K64.8 OTHER HEMORRHOIDS: ICD-10-CM

## 2024-07-03 DIAGNOSIS — K63.5 POLYP OF COLON: ICD-10-CM

## 2024-07-03 PROCEDURE — 99214 OFFICE O/P EST MOD 30 MIN: CPT

## 2024-07-03 PROCEDURE — G2211 COMPLEX E/M VISIT ADD ON: CPT

## 2024-07-04 LAB
ALBUMIN SERPL ELPH-MCNC: 4.2 G/DL
ALP BLD-CCNC: 61 U/L
ALT SERPL-CCNC: 17 U/L
ANION GAP SERPL CALC-SCNC: 16 MMOL/L
AST SERPL-CCNC: 21 U/L
BILIRUB SERPL-MCNC: 1.3 MG/DL
BUN SERPL-MCNC: 19 MG/DL
CALCIUM SERPL-MCNC: 9.4 MG/DL
CHLORIDE SERPL-SCNC: 107 MMOL/L
CO2 SERPL-SCNC: 20 MMOL/L
CREAT SERPL-MCNC: 1.01 MG/DL
EGFR: 80 ML/MIN/1.73M2
GLUCOSE SERPL-MCNC: 150 MG/DL
HCT VFR BLD CALC: 36.9 %
HGB BLD-MCNC: 12.5 G/DL
MCHC RBC-ENTMCNC: 31 PG
MCHC RBC-ENTMCNC: 33.9 GM/DL
MCV RBC AUTO: 91.6 FL
PLATELET # BLD AUTO: 111 K/UL
POTASSIUM SERPL-SCNC: 3.8 MMOL/L
PROT SERPL-MCNC: 6.7 G/DL
RBC # BLD: 4.03 M/UL
RBC # FLD: 12.5 %
SODIUM SERPL-SCNC: 143 MMOL/L
TSH SERPL-ACNC: 0.35 UIU/ML
WBC # FLD AUTO: 4.71 K/UL

## 2024-07-07 ENCOUNTER — LABORATORY RESULT (OUTPATIENT)
Age: 69
End: 2024-07-07

## 2024-07-08 ENCOUNTER — LABORATORY RESULT (OUTPATIENT)
Age: 69
End: 2024-07-08

## 2024-07-08 LAB
ALMOND IGE QN: <0.1 KUA/L
BRAZIL NUT IGE QN: <0.1 KUA/L
CASHEW NUT IGE QN: <0.1 KUA/L
CODFISH IGE QN: <0.1 KUA/L
COW MILK IGE QN: <0.1 KUA/L
DEPRECATED ALMOND IGE RAST QL: 0 (ref 0–?)
DEPRECATED BRAZIL NUT IGE RAST QL: 0 (ref 0–?)
DEPRECATED CASHEW NUT IGE RAST QL: 0 (ref 0–?)
DEPRECATED CODFISH IGE RAST QL: 0 (ref 0–?)
DEPRECATED COW MILK IGE RAST QL: 0 (ref 0–?)
DEPRECATED EGG WHITE IGE RAST QL: 0 (ref 0–?)
DEPRECATED HAZELNUT IGE RAST QL: 0 (ref 0–?)
DEPRECATED PEANUT IGE RAST QL: 0 (ref 0–?)
DEPRECATED SALMON IGE RAST QL: 0 (ref 0–?)
DEPRECATED SCALLOP IGE RAST QL: <0.1 KUA/L
DEPRECATED SESAME SEED IGE RAST QL: 0 (ref 0–?)
DEPRECATED SHRIMP IGE RAST QL: 0 (ref 0–?)
DEPRECATED SOYBEAN IGE RAST QL: 0 (ref 0–?)
DEPRECATED TUNA IGE RAST QL: 0 (ref 0–?)
DEPRECATED WALNUT IGE RAST QL: 0 (ref 0–?)
DEPRECATED WHEAT IGE RAST QL: 0 (ref 0–?)
EGG WHITE IGE QN: <0.1 KUA/L
HAZELNUT IGE QN: <0.1 KUA/L
PEANUT IGE QN: <0.1 KUA/L
SALMON IGE QN: <0.1 KUA/L
SCALLOP IGE QN: 0 (ref 0–?)
SCALLOP IGE QN: <0.1 KUA/L
SESAME SEED IGE QN: <0.1 KUA/L
SOYBEAN IGE QN: <0.1 KUA/L
TOTAL IGE SMQN RAST: 7 KU/L
TUNA IGE QN: <0.1 KUA/L
WALNUT IGE QN: <0.1 KUA/L
WHEAT IGE QN: <0.1 KUA/L

## 2024-07-09 LAB
GI PCR PANEL: NOT DETECTED
HEMOCCULT STL QL IA: NEGATIVE

## 2024-07-10 LAB — H PYLORI AG STL QL: NEGATIVE

## 2024-07-11 LAB
C DIFF TOXIN B QL PCR REFLEX: NORMAL
CELIAC DISEASE INTERPRETATION: NORMAL
CELIAC GENE PAIRS PRESENT: NO
DQ ALPHA 1: NORMAL
DQ BETA 1: NORMAL
GDH ANTIGEN: DETECTED
IMMUNOGLOBULIN A (IGA): 224 MG/DL
TOXIN A AND B: NOT DETECTED

## 2024-07-12 LAB — BACTERIA STL CULT: NORMAL

## 2024-07-15 LAB
ANTI-A4 FLA2 IGG ELISA: 4.3 EU/ML
ANTI-CBIR1 ELISA: 10 EU/ML
ANTI-FLAX IGG ELISA: 5.5 EU/ML
ANTI-OMPC IGA ELISA: 7.5 EU/ML
ASCA IGA ELISA: 13.2 EU/ML
ASCA IGG ELISA: 20 EU/ML
ATG16L1 SNP (RS2241880): NORMAL
CALPROTECTIN FECAL: 95 UG/G
CRP PROMTHEUS: 4.5 MG/L
ECM1 SNP (RS3737240): NORMAL
IBD AB 7 PNL SER: NORMAL
IBD-SPECIFIC PANCA IFA PATTERN DNASE SENSITIVITY: NOT DETECTED
IBD-SPECIFIC PANCA IFA PERINUCLEAR PATTERN: NOT DETECTED
ICAM-1 PROMETHEUS: 0.59 UG/ML
NKX2-3 SNP (RS10883365): NORMAL
PROMETHEUS LABORATORY FOOTER: NORMAL
SAA PROMETHEUS: 1.8 MG/L
STAT3 SNP (RS744166): NORMAL
VCAM-1 PROMETHEUS: 0.6 UG/ML
VEGF PROMETHEUS: 147 PG/ML

## 2024-07-16 LAB — LACTOFERRIN STL-MCNC: <1 CD:794062635

## 2024-07-18 LAB — PANCREATIC ELASTASE, FECAL: >800 CD:794062645

## 2024-08-14 ENCOUNTER — APPOINTMENT (OUTPATIENT)
Dept: GASTROENTEROLOGY | Facility: CLINIC | Age: 69
End: 2024-08-14
Payer: MEDICARE

## 2024-08-14 VITALS
OXYGEN SATURATION: 98 % | HEART RATE: 85 BPM | WEIGHT: 164 LBS | HEIGHT: 68 IN | DIASTOLIC BLOOD PRESSURE: 64 MMHG | BODY MASS INDEX: 24.86 KG/M2 | SYSTOLIC BLOOD PRESSURE: 155 MMHG | TEMPERATURE: 98.3 F

## 2024-08-14 DIAGNOSIS — R14.1 GAS PAIN: ICD-10-CM

## 2024-08-14 DIAGNOSIS — A04.72 ENTEROCOLITIS DUE TO CLOSTRIDIUM DIFFICILE, NOT SPECIFIED AS RECURRENT: ICD-10-CM

## 2024-08-14 DIAGNOSIS — R19.7 DIARRHEA, UNSPECIFIED: ICD-10-CM

## 2024-08-14 PROCEDURE — 99214 OFFICE O/P EST MOD 30 MIN: CPT

## 2024-08-14 RX ORDER — FIDAXOMICIN 200 MG/1
200 TABLET, FILM COATED ORAL
Qty: 20 | Refills: 1 | Status: ACTIVE | COMMUNITY
Start: 2024-08-14 | End: 1900-01-01

## 2024-08-14 RX ORDER — VANCOMYCIN HYDROCHLORIDE 125 MG/1
125 CAPSULE ORAL
Qty: 56 | Refills: 0 | Status: ACTIVE | COMMUNITY
Start: 2024-08-14 | End: 1900-01-01

## 2024-08-15 NOTE — ASSESSMENT
[FreeTextEntry1] : Vanco x 10 days   F/U CT  A low acid / reflux diet was discussed in great detail including  not smoking, not drinking alcohol, and not consuming foods that irritate the esophagus. It is helpful to eat small meals throughout the day instead of large meals. You should avoid eating before bedtime or lying down after you eat. It can be helpful to raise the head of your bed six inches. Additionally, you should maintain a healthy weight and good posture.. The patient was given written material to take home and review.  I spent 35 minutes reviewing the patients records prior to arrival, with patient , and reviewing records after visit. All prior testing reviewed at length. All questions were answered.

## 2024-09-04 ENCOUNTER — APPOINTMENT (OUTPATIENT)
Dept: GASTROENTEROLOGY | Facility: CLINIC | Age: 69
End: 2024-09-04
Payer: MEDICARE

## 2024-09-04 DIAGNOSIS — A04.72 ENTEROCOLITIS DUE TO CLOSTRIDIUM DIFFICILE, NOT SPECIFIED AS RECURRENT: ICD-10-CM

## 2024-09-04 DIAGNOSIS — R14.0 ABDOMINAL DISTENSION (GASEOUS): ICD-10-CM

## 2024-09-04 PROCEDURE — G2211 COMPLEX E/M VISIT ADD ON: CPT

## 2024-09-04 PROCEDURE — 99214 OFFICE O/P EST MOD 30 MIN: CPT

## 2024-09-04 RX ORDER — METRONIDAZOLE 500 MG/1
500 TABLET ORAL 3 TIMES DAILY
Qty: 42 | Refills: 2 | Status: ACTIVE | COMMUNITY
Start: 2024-09-04 | End: 1900-01-01

## 2024-09-04 RX ORDER — ONDANSETRON 4 MG/1
4 TABLET ORAL
Qty: 30 | Refills: 1 | Status: ACTIVE | COMMUNITY
Start: 2024-09-04 | End: 1900-01-01

## 2024-09-06 NOTE — ASSESSMENT
[FreeTextEntry1] : Pt only  had + GDH   Treated regardless  Spiro well on ABX  Likely IBS/ SIBO   Will give 14 days of flagyl then reasses  No use for stool Cx now  1) Bloating    A low FODMAP diet was discussed with the patient at length. The patient had multiple questions all of which were answered. I recommended a nutritionist. Also recommended that the patient keep a food diary. We discussed  options such as Vegetables. Fresh fruits. Dairy that is lactose-free, and hard cheeses, or ripened/matured cheeses including... Beef, pork, chicken, fish, eggs. Avoid breadcrumbs, marinades, and sauces/gravies that may be high in FODMAPs. Soy products including tofu, tempeh. Grains.   2) GAS  We discussed Probiotics and limiting leafy vegetables and other changes  3) GERD  We discussed a low acid , high protien diet   Avoid Spicey oily greasy foods  4) Change in Bowels   We discussed the proper use of fiber and water intake   The causes of Bowel irregularities were discussed at length  We discussed : Eat three meals each day. Do not skip meals. Gradually increase the amount of FIBER  in your diet. Choose more whole grain breads, cereals and rice. Select more raw fruits and vegetables -- eat the peel, if appropriate. Read food labels and look for the "dietary fiber" content of foods. Good sources have 2 grams of fiber or more. Drink six to eight glasses of water each day. Limit highly refined and processed foods.      All questions answered  Reading material given   F/U in office after procedure or in 8 weeks    The above medical issues were discussed in detail and all questions answered over a 45 minute period.

## 2024-09-06 NOTE — PHYSICAL EXAM

## 2024-09-06 NOTE — HISTORY OF PRESENT ILLNESS
[FreeTextEntry1] : Mild Bloat s/p Vanco x 14 days  Mild Loose stools   PCR + CDT  No /F/C  CT OK   Looks well

## 2024-09-11 DIAGNOSIS — R19.7 DIARRHEA, UNSPECIFIED: ICD-10-CM

## 2024-09-11 DIAGNOSIS — R10.31 RIGHT LOWER QUADRANT PAIN: ICD-10-CM

## 2024-09-11 DIAGNOSIS — R19.4 CHANGE IN BOWEL HABIT: ICD-10-CM

## 2024-09-24 RX ORDER — RIFAXIMIN 550 MG/1
550 TABLET ORAL 3 TIMES DAILY
Qty: 30 | Refills: 2 | Status: ACTIVE | COMMUNITY
Start: 2024-09-24 | End: 1900-01-01

## 2024-09-27 RX ORDER — PANCRELIPASE 36000; 180000; 114000 [USP'U]/1; [USP'U]/1; [USP'U]/1
36000-114000 CAPSULE, DELAYED RELEASE PELLETS ORAL
Qty: 270 | Refills: 3 | Status: ACTIVE | COMMUNITY
Start: 2024-09-25 | End: 1900-01-01

## 2024-10-09 ENCOUNTER — EMERGENCY (EMERGENCY)
Facility: HOSPITAL | Age: 69
LOS: 1 days | Discharge: HOME | End: 2024-10-09
Attending: EMERGENCY MEDICINE
Payer: MEDICARE

## 2024-10-09 VITALS
RESPIRATION RATE: 18 BRPM | SYSTOLIC BLOOD PRESSURE: 160 MMHG | DIASTOLIC BLOOD PRESSURE: 62 MMHG | HEART RATE: 74 BPM | HEIGHT: 68 IN | TEMPERATURE: 98 F | OXYGEN SATURATION: 98 % | WEIGHT: 160.06 LBS

## 2024-10-09 LAB
ALBUMIN SERPL ELPH-MCNC: 4.4 G/DL — SIGNIFICANT CHANGE UP (ref 3.5–5)
ALP SERPL-CCNC: 53 U/L — SIGNIFICANT CHANGE UP (ref 40–120)
ALT FLD-CCNC: 27 U/L DA — SIGNIFICANT CHANGE UP (ref 10–60)
ANION GAP SERPL CALC-SCNC: 8 MMOL/L — SIGNIFICANT CHANGE UP (ref 5–17)
AST SERPL-CCNC: 19 U/L — SIGNIFICANT CHANGE UP (ref 10–40)
BASOPHILS # BLD AUTO: 0.01 K/UL — SIGNIFICANT CHANGE UP (ref 0–0.2)
BASOPHILS NFR BLD AUTO: 0.2 % — SIGNIFICANT CHANGE UP (ref 0–2)
BILIRUB SERPL-MCNC: 1.3 MG/DL — HIGH (ref 0.2–1.2)
BUN SERPL-MCNC: 12 MG/DL — SIGNIFICANT CHANGE UP (ref 7–18)
C DIFF GDH STL QL: SIGNIFICANT CHANGE UP
C DIFF GDH STL QL: SIGNIFICANT CHANGE UP
CALCIUM SERPL-MCNC: 9.6 MG/DL — SIGNIFICANT CHANGE UP (ref 8.4–10.5)
CHLORIDE SERPL-SCNC: 106 MMOL/L — SIGNIFICANT CHANGE UP (ref 96–108)
CO2 SERPL-SCNC: 24 MMOL/L — SIGNIFICANT CHANGE UP (ref 22–31)
CREAT SERPL-MCNC: 1.09 MG/DL — SIGNIFICANT CHANGE UP (ref 0.5–1.3)
EGFR: 73 ML/MIN/1.73M2 — SIGNIFICANT CHANGE UP
EGFR: 73 ML/MIN/1.73M2 — SIGNIFICANT CHANGE UP
EOSINOPHIL # BLD AUTO: 0.04 K/UL — SIGNIFICANT CHANGE UP (ref 0–0.5)
EOSINOPHIL NFR BLD AUTO: 0.8 % — SIGNIFICANT CHANGE UP (ref 0–6)
GLUCOSE SERPL-MCNC: 88 MG/DL — SIGNIFICANT CHANGE UP (ref 70–99)
HCT VFR BLD CALC: 40 % — SIGNIFICANT CHANGE UP (ref 39–50)
HGB BLD-MCNC: 14.3 G/DL — SIGNIFICANT CHANGE UP (ref 13–17)
HIV 1 & 2 AB SERPL IA.RAPID: SIGNIFICANT CHANGE UP
IMM GRANULOCYTES NFR BLD AUTO: 0.2 % — SIGNIFICANT CHANGE UP (ref 0–0.9)
LACTATE SERPL-SCNC: 0.7 MMOL/L — SIGNIFICANT CHANGE UP (ref 0.7–2)
LIDOCAIN IGE QN: 33 U/L — SIGNIFICANT CHANGE UP (ref 13–75)
LYMPHOCYTES # BLD AUTO: 0.97 K/UL — LOW (ref 1–3.3)
LYMPHOCYTES # BLD AUTO: 20.2 % — SIGNIFICANT CHANGE UP (ref 13–44)
MAGNESIUM SERPL-MCNC: 1.9 MG/DL — SIGNIFICANT CHANGE UP (ref 1.6–2.6)
MCHC RBC-ENTMCNC: 30.1 PG — SIGNIFICANT CHANGE UP (ref 27–34)
MCHC RBC-ENTMCNC: 35.8 GM/DL — SIGNIFICANT CHANGE UP (ref 32–36)
MCV RBC AUTO: 84.2 FL — SIGNIFICANT CHANGE UP (ref 80–100)
MONOCYTES # BLD AUTO: 0.5 K/UL — SIGNIFICANT CHANGE UP (ref 0–0.9)
MONOCYTES NFR BLD AUTO: 10.4 % — SIGNIFICANT CHANGE UP (ref 2–14)
NEUTROPHILS # BLD AUTO: 3.27 K/UL — SIGNIFICANT CHANGE UP (ref 1.8–7.4)
NEUTROPHILS NFR BLD AUTO: 68.2 % — SIGNIFICANT CHANGE UP (ref 43–77)
NRBC # BLD: 0 /100 WBCS — SIGNIFICANT CHANGE UP (ref 0–0)
NRBC BLD-RTO: 0 /100 WBCS — SIGNIFICANT CHANGE UP (ref 0–0)
PHOSPHATE SERPL-MCNC: 4.2 MG/DL — SIGNIFICANT CHANGE UP (ref 2.5–4.5)
PLATELET # BLD AUTO: 118 K/UL — LOW (ref 150–400)
POTASSIUM SERPL-MCNC: 4 MMOL/L — SIGNIFICANT CHANGE UP (ref 3.5–5.3)
POTASSIUM SERPL-SCNC: 4 MMOL/L — SIGNIFICANT CHANGE UP (ref 3.5–5.3)
PROT SERPL-MCNC: 7.8 G/DL — SIGNIFICANT CHANGE UP (ref 6–8.3)
RBC # BLD: 4.75 M/UL — SIGNIFICANT CHANGE UP (ref 4.2–5.8)
RBC # FLD: 12.4 % — SIGNIFICANT CHANGE UP (ref 10.3–14.5)
SODIUM SERPL-SCNC: 138 MMOL/L — SIGNIFICANT CHANGE UP (ref 135–145)
TROPONIN I, HIGH SENSITIVITY RESULT: 7 NG/L — SIGNIFICANT CHANGE UP
WBC # BLD: 4.8 K/UL — SIGNIFICANT CHANGE UP (ref 3.8–10.5)
WBC # FLD AUTO: 4.8 K/UL — SIGNIFICANT CHANGE UP (ref 3.8–10.5)

## 2024-10-09 PROCEDURE — 84100 ASSAY OF PHOSPHORUS: CPT

## 2024-10-09 PROCEDURE — 85025 COMPLETE CBC W/AUTO DIFF WBC: CPT

## 2024-10-09 PROCEDURE — 36000 PLACE NEEDLE IN VEIN: CPT

## 2024-10-09 PROCEDURE — 87177 OVA AND PARASITES SMEARS: CPT

## 2024-10-09 PROCEDURE — 87521 HEPATITIS C PROBE&RVRS TRNSC: CPT

## 2024-10-09 PROCEDURE — 87507 IADNA-DNA/RNA PROBE TQ 12-25: CPT

## 2024-10-09 PROCEDURE — 80053 COMPREHEN METABOLIC PANEL: CPT

## 2024-10-09 PROCEDURE — 86803 HEPATITIS C AB TEST: CPT

## 2024-10-09 PROCEDURE — 99284 EMERGENCY DEPT VISIT MOD MDM: CPT | Mod: 25

## 2024-10-09 PROCEDURE — 87324 CLOSTRIDIUM AG IA: CPT

## 2024-10-09 PROCEDURE — 74177 CT ABD & PELVIS W/CONTRAST: CPT | Mod: MC

## 2024-10-09 PROCEDURE — 36415 COLL VENOUS BLD VENIPUNCTURE: CPT

## 2024-10-09 PROCEDURE — 83605 ASSAY OF LACTIC ACID: CPT

## 2024-10-09 PROCEDURE — 83735 ASSAY OF MAGNESIUM: CPT

## 2024-10-09 PROCEDURE — 74177 CT ABD & PELVIS W/CONTRAST: CPT | Mod: 26,MC

## 2024-10-09 PROCEDURE — 99285 EMERGENCY DEPT VISIT HI MDM: CPT

## 2024-10-09 PROCEDURE — 84484 ASSAY OF TROPONIN QUANT: CPT

## 2024-10-09 PROCEDURE — 86703 HIV-1/HIV-2 1 RESULT ANTBDY: CPT

## 2024-10-09 PROCEDURE — 87449 NOS EACH ORGANISM AG IA: CPT

## 2024-10-09 PROCEDURE — 83690 ASSAY OF LIPASE: CPT

## 2024-10-09 RX ADMIN — Medication 1000 MILLILITER(S): at 17:29

## 2024-10-10 LAB
CULTURE RESULTS: SIGNIFICANT CHANGE UP
GI PCR PANEL: SIGNIFICANT CHANGE UP
HCV AB S/CO SERPL IA: 0.91 S/CO — SIGNIFICANT CHANGE UP (ref 0–0.99)
HCV AB SERPL-IMP: ABNORMAL
HCV RNA FLD QL NAA+PROBE: SIGNIFICANT CHANGE UP
HCV RNA SPEC QL PROBE+SIG AMP: SIGNIFICANT CHANGE UP
SPECIMEN SOURCE: SIGNIFICANT CHANGE UP

## 2024-10-16 ENCOUNTER — APPOINTMENT (OUTPATIENT)
Dept: GASTROENTEROLOGY | Facility: CLINIC | Age: 69
End: 2024-10-16

## 2024-10-16 VITALS
HEIGHT: 60 IN | SYSTOLIC BLOOD PRESSURE: 135 MMHG | HEART RATE: 68 BPM | RESPIRATION RATE: 17 BRPM | DIASTOLIC BLOOD PRESSURE: 55 MMHG | BODY MASS INDEX: 30.82 KG/M2 | WEIGHT: 157 LBS | TEMPERATURE: 97.9 F | OXYGEN SATURATION: 96 %

## 2024-10-16 DIAGNOSIS — D12.4 BENIGN NEOPLASM OF DESCENDING COLON: ICD-10-CM

## 2024-10-16 DIAGNOSIS — K29.50 UNSPECIFIED CHRONIC GASTRITIS W/OUT BLEEDING: ICD-10-CM

## 2024-10-16 DIAGNOSIS — K63.5 POLYP OF COLON: ICD-10-CM

## 2024-10-16 DIAGNOSIS — D12.6 BENIGN NEOPLASM OF COLON, UNSPECIFIED: ICD-10-CM

## 2024-10-16 DIAGNOSIS — N32.89 OTHER SPECIFIED DISORDERS OF BLADDER: ICD-10-CM

## 2024-10-16 DIAGNOSIS — K64.8 OTHER HEMORRHOIDS: ICD-10-CM

## 2024-10-16 DIAGNOSIS — K21.9 GASTRO-ESOPHAGEAL REFLUX DISEASE W/OUT ESOPHAGITIS: ICD-10-CM

## 2024-10-16 PROCEDURE — 99214 OFFICE O/P EST MOD 30 MIN: CPT

## 2024-10-31 NOTE — ED PROVIDER NOTE - RADIATION
Ruthie Sheridan --    Please review and advise: calcium vitamin recommendation requested by patient.   See Athoshart message.     MOLLY NiñoN RN  [facility]     FELA dahl

## 2024-11-14 ENCOUNTER — NON-APPOINTMENT (OUTPATIENT)
Age: 69
End: 2024-11-14

## 2024-12-04 ENCOUNTER — APPOINTMENT (OUTPATIENT)
Dept: GASTROENTEROLOGY | Facility: CLINIC | Age: 69
End: 2024-12-04

## 2024-12-12 ENCOUNTER — OUTPATIENT (OUTPATIENT)
Dept: OUTPATIENT SERVICES | Facility: HOSPITAL | Age: 69
LOS: 1 days | End: 2024-12-12

## 2024-12-12 VITALS
OXYGEN SATURATION: 96 % | DIASTOLIC BLOOD PRESSURE: 61 MMHG | RESPIRATION RATE: 16 BRPM | TEMPERATURE: 97 F | HEIGHT: 66 IN | HEART RATE: 52 BPM | SYSTOLIC BLOOD PRESSURE: 130 MMHG | WEIGHT: 164.02 LBS

## 2024-12-12 DIAGNOSIS — K40.90 UNILATERAL INGUINAL HERNIA, WITHOUT OBSTRUCTION OR GANGRENE, NOT SPECIFIED AS RECURRENT: ICD-10-CM

## 2024-12-12 DIAGNOSIS — Z91.89 OTHER SPECIFIED PERSONAL RISK FACTORS, NOT ELSEWHERE CLASSIFIED: ICD-10-CM

## 2024-12-12 DIAGNOSIS — I10 ESSENTIAL (PRIMARY) HYPERTENSION: ICD-10-CM

## 2024-12-12 DIAGNOSIS — Z87.438 PERSONAL HISTORY OF OTHER DISEASES OF MALE GENITAL ORGANS: ICD-10-CM

## 2024-12-12 DIAGNOSIS — K40.30 UNILATERAL INGUINAL HERNIA, WITH OBSTRUCTION, WITHOUT GANGRENE, NOT SPECIFIED AS RECURRENT: ICD-10-CM

## 2024-12-12 DIAGNOSIS — E03.9 HYPOTHYROIDISM, UNSPECIFIED: ICD-10-CM

## 2024-12-12 NOTE — H&P PST ADULT - RESPIRATORY AND THORAX COMMENTS
had a routine pulmonary eval in 5/2024 - h/o COVID pneumonia in 2020, picked up pneumonia on Xray in 3/2024, Pulmonary function testing:There is a mild reduction in lung volume deficit, Ct scan 4/2024 Mild reticular opacities without significant change. Small stable pulmonary nodules. Resolved right upper lobe groundglass opacities present on CT of 2/29/2024

## 2024-12-12 NOTE — H&P PST ADULT - NSANTHBMIRD_ENT_A_CORE
"Chief Complaint   Patient presents with     Port Draw     port accessed and labs drawn by rn.  vital signs taken.     Port accessed by RN in lab with 20g 3/4\" power needle, labs drawn, port flushed with saline and heparin, vitals checked, pt checked in for next appointment.  Carey Larson RN    " No

## 2024-12-12 NOTE — H&P PST ADULT - GASTROINTESTINAL COMMENTS
had C-Diff 2024, was evaluated in the ED on 10/9/24, for diarrhea, tested negative for Cdiff, had a GI evaluation. reports symptoms has resolved.  h/o right inguinal hernia preop dx right inguinal hernia

## 2024-12-12 NOTE — H&P PST ADULT - NS HP PST ANES REACTION
Vaccine Information Statement(s) was given today. This has been reviewed, questions answered, and verbal consent given by Patient for injection(s) and administration of Influenza (Inactivated).    Patient tolerated without incident. See immunization grid for documentation.         No

## 2024-12-12 NOTE — H&P PST ADULT - PRO ARRIVE FROM
home Low Dose Naltrexone Counseling- I discussed with the patient the potential risks and side effects of low dose naltrexone including but not limited to: more vivid dreams, headaches, nausea, vomiting, abdominal pain, fatigue, dizziness, and anxiety.

## 2024-12-12 NOTE — H&P PST ADULT - CARDIOVASCULAR
details… normal/regular rate and rhythm/S1 S2 present/no gallops/no rub/no pedal edema/murmur/vascular

## 2024-12-12 NOTE — H&P PST ADULT - PROBLEM SELECTOR PLAN 2
Patient instructed to take metoprolol losartan nifedipine with a sip of water on the morning of procedure.

## 2024-12-12 NOTE — H&P PST ADULT - CARDIOVASCULAR COMMENTS
reports stable aortic aneurysm, heart murmur, asymptomatic, under surveillance monitoring with cardiology, had a recent echo for preop

## 2024-12-12 NOTE — H&P PST ADULT - HISTORY OF PRESENT ILLNESS
70 y/o male PMH  Hypothyroidism, HTN, BPH, Aortic aneurysm, heart murmur, urethral stricture (h/o urethral dilation about 6 times in the past), C-diff infection (resolved, tested negative in 10/2024) presents to presurgical testing with diagnosis of unilateral inguinal hernia without obstruction or gangrene not recurrent. Pt with right inguinal discomfort. Pt is scheduled for right inguinal hernia repair.

## 2024-12-12 NOTE — H&P PST ADULT - NSICDXPASTMEDICALHX_GEN_ALL_CORE_FT
PAST MEDICAL HISTORY:  BPH (benign prostatic hyperplasia)     BPH (Benign Prostatic Hypertrophy)     H/O aortic aneurysm     Heart murmur     HTN (hypertension)     Hypothyroid     Osteoarthritis     Right inguinal hernia

## 2024-12-12 NOTE — H&P PST ADULT - NSANTHOSAYNRD_GEN_A_CORE
No. NAWAF screening performed.  STOP BANG Legend: 0-2 = LOW Risk; 3-4 = INTERMEDIATE Risk; 5-8 = HIGH Risk

## 2024-12-12 NOTE — H&P PST ADULT - PROBLEM SELECTOR PLAN 1
Patient tentatively scheduled for right inguinal hernia repair for 12/19/24. Pre-op instructions provided. Pt given verbal and written instructions with teach back on. Pt will take own omeprazole on the morning of procedure for GI prophylaxis.  Pt verbalized understanding with return demonstration.     Labs done in the ED on 10/9/24 WNL.    Pending copy of recent echo due to h/o heart murmur and aortic aneurysm

## 2024-12-19 ENCOUNTER — TRANSCRIPTION ENCOUNTER (OUTPATIENT)
Age: 69
End: 2024-12-19

## 2024-12-19 ENCOUNTER — OUTPATIENT (OUTPATIENT)
Dept: OUTPATIENT SERVICES | Facility: HOSPITAL | Age: 69
LOS: 1 days | Discharge: HOME | End: 2024-12-19
Payer: MEDICARE

## 2024-12-19 VITALS
TEMPERATURE: 98 F | HEIGHT: 66 IN | DIASTOLIC BLOOD PRESSURE: 46 MMHG | HEART RATE: 66 BPM | SYSTOLIC BLOOD PRESSURE: 136 MMHG | RESPIRATION RATE: 20 BRPM | WEIGHT: 164.02 LBS | OXYGEN SATURATION: 96 %

## 2024-12-19 VITALS
OXYGEN SATURATION: 99 % | SYSTOLIC BLOOD PRESSURE: 143 MMHG | HEART RATE: 57 BPM | TEMPERATURE: 98 F | DIASTOLIC BLOOD PRESSURE: 49 MMHG | RESPIRATION RATE: 14 BRPM

## 2024-12-19 DIAGNOSIS — K40.30 UNILATERAL INGUINAL HERNIA, WITH OBSTRUCTION, WITHOUT GANGRENE, NOT SPECIFIED AS RECURRENT: ICD-10-CM

## 2024-12-19 PROCEDURE — 88302 TISSUE EXAM BY PATHOLOGIST: CPT | Mod: 26

## 2024-12-19 DEVICE — MESH HERNIA PERFIX PLUG LARGE 1.6 X 1.90": Type: IMPLANTABLE DEVICE | Site: RIGHT | Status: FUNCTIONAL

## 2024-12-19 RX ORDER — LOSARTAN POTASSIUM AND HYDROCHLOROTHIAZIDE 12.5; 5 MG/1; MG/1
1 TABLET ORAL
Refills: 0 | DISCHARGE

## 2024-12-19 RX ORDER — OXYCODONE HYDROCHLORIDE 30 MG/1
1 TABLET ORAL
Qty: 20 | Refills: 0
Start: 2024-12-19

## 2024-12-19 RX ORDER — NIFEDIPINE 10 MG
1 CAPSULE ORAL
Refills: 0 | DISCHARGE

## 2024-12-19 RX ORDER — METOPROLOL TARTRATE 100 MG/1
1 TABLET, FILM COATED ORAL
Refills: 0 | DISCHARGE

## 2024-12-19 RX ORDER — OMEPRAZOLE 20 MG/1
1 CAPSULE, DELAYED RELEASE ORAL
Refills: 0 | DISCHARGE

## 2024-12-19 NOTE — BRIEF OPERATIVE NOTE - OPERATION/FINDINGS
Made a 3cm incision just superior to the right inguinal ligament. Dissected down to the level of the spermatic cord. Dissected the hernia sac off of the cord structures. The cord lipoma was excised. A mesh was then placed inside the defect and sutured in place. The fascia was then closed anteriorly. The incision was then closed with fascial sutures, deep dermal sutures, and skin closure.

## 2024-12-19 NOTE — ASU DISCHARGE PLAN (ADULT/PEDIATRIC) - FINANCIAL ASSISTANCE
[facility] provides services at a reduced cost to those who are determined to be eligible through [facility]’s financial assistance program. Information regarding [facility]’s financial assistance program can be found by going to [email or web address] or by calling [phone].

## 2024-12-19 NOTE — BRIEF OPERATIVE NOTE - NSICDXBRIEFPROCEDURE_GEN_ALL_CORE_FT
Chief Complaint   Patient presents with     Derm Problem     Discuss itchy rash on skin.      Depression Response    Patient completed the PHQ-9 assessment for depression and scored >9? No  Question 9 on the PHQ-9 was positive for suicidality? No  Does patient have current mental health provider? No    Is this a virtual visit? No    I personally notified the following: visit provider       TERA Doll 10:26 AM  9/23/2020     PROCEDURES:  Repair, hernia, inguinal, open, using mesh, adult 19-Dec-2024 16:21:56  Alexandre Scott

## 2024-12-19 NOTE — ASU DISCHARGE PLAN (ADULT/PEDIATRIC) - CARE PROVIDER_API CALL
11/19/19 2242   Child Life   Location ED   Intervention Procedure Support;Family Support;Preparation;Initial Assessment   Preparation Comment CFLS met with pts mother to discuss IV start and comfort holds. Pt was swaddled and then mom at bedside providing comfort while helping to hold the swaddle. Sweetease was used for comfort. Pt was not distractable with items or music. Mom stated pt loves michael Mac which CFLS played during procedure. CFLS also provided comfort items for admission. Pt was difficult to settle post placement of high flow and IV. CFLS assisted mom in calling pts father in the room, no additional needs identified prior to pt going up to the floor    Family Support Comment Intro to CFL services and self. Pts mother present and supportive.    Impact on Inpatient Care Comfort items provided for admission comfort    Anxiety Appropriate;Moderate Anxiety   Techniques to [place] with Loss/Stress/Change diversional activity;family presence;music;pacifier  ([place] Mac)   Able to Shift Focus From Anxiety Difficult   Outcomes/Follow Up Provided Materials;Continue to Follow/Support      Yifan Fulton  Surgery  [address], [address]  [place] [address]  Phone: [phone]  Fax: [phone]  Follow Up Time: 2 weeks

## 2024-12-19 NOTE — ASU DISCHARGE PLAN (ADULT/PEDIATRIC) - ASU DC SPECIAL INSTRUCTIONSFT
You just had Hernia surgery. Please follow the instructions below to make your recovery as comfortable as possible.    **REST! Apply ice packs to incision sites 20 mins on, 20 mins off every 4 hours for the first 24 hours.    If taking narcotic pain medications, you may take COLACE (OTC stool softener) as directed to prevent constipation.**    1. Depending on the procedure, you may or may not have pain. Please fill any prescriptions you have been given and take as directed.    2. Remove outer dressing (if any) in 24 hours before showering, leave white steri strips underneath in place.    3. You could experience minimal to mild blood staining of your dressing. If bleeding is persistent, but light, apply direct and gentle pressure to the area and lie flat in bed for 10-15 min. If bleeding is persistent and heavy or is associated with clots call the office immediately.    4. If you are unable to urinate after adequate hydration in 8-10 hours, call the office.    5. No lifting >20 lbs for 6-8 weeks    NOTE: Some swelling and or bruising  near or around the testicles can be expected and is considered normal. If you have any swelling or pain, you can apply ice packs to the area (15 minutes on/ 15 minutes off).     **Please call the office for an appointment when you get home ([phone]). If you have any questions, problems or concerns, do not hesitate to call the office.**

## 2024-12-19 NOTE — ASU DISCHARGE PLAN (ADULT/PEDIATRIC) - NS MD DC FALL RISK RISK
For information on Fall & Injury Prevention, visit: [email or web address] OR  [email or web address] OR  https://www.cdc.gov/steadi/patient.html

## 2024-12-19 NOTE — BRIEF OPERATIVE NOTE - NSEVIDENCEINFORABS_GEN_ALL_CORE
To reduce the risk of stroke or heart disease, a healthy diet and regular exercise are extremely important in addition to medical treatment.    A Mediterranean style diet, or the DASH diet with lots of fresh fruits and vegetables, whole grains, more fish and chicken, less red meat, less dairy, less processed foods is beneficial.      Recommend taking omega-3 fatty acids with sufficient amounts of EPA and DHA, 1000 to 2000 mg a day    Walk 30 minutes a day, 3-5 days a week - if joint pain is an issue, consider stationary bicycle, swimming, or other low impact exercises.  Relaxation and stress reduction through exercise, yoga, mindfulness meditation, or formal counseling is also beneficial overall towards health.    Control of high blood pressure, elevated cholesterol (specifically LDL target below 70), blood sugar and diabetes control, not smoking, avoiding excessive alcohol consumption (less than one alcoholic drink a day for women, two a day for men) and regular follow up with your doctor can significantly reduce the risk of future stroke.     Considering the fact that this was in fact a stroke, not a TIA, I would recommend that she wait for 9 months to have her elective eye surgery as she is going to have to be off the aspirin prior to the procedure.     
No

## 2024-12-19 NOTE — ASU DISCHARGE PLAN (ADULT/PEDIATRIC) - PAIN MANAGEMENT
Prescriptions electronically submitted to pharmacy from [facility] T[facility]enol after 10pm/Prescriptions electronically submitted to pharmacy from [facility]

## 2024-12-19 NOTE — BRIEF OPERATIVE NOTE - FIRST ASSIST NAME
The patient location is: home  The chief complaint leading to consultation is: headache    Visit type: audiovisual    Face to Face time with patient: 8 minutes  10 minutes of total time spent on the encounter, which includes face to face time and non-face to face time preparing to see the patient (eg, review of tests), Obtaining and/or reviewing separately obtained history, Documenting clinical information in the electronic or other health record, Independently interpreting results (not separately reported) and communicating results to the patient/family/caregiver, or Care coordination (not separately reported).         Each patient to whom he or she provides medical services by telemedicine is:  (1) informed of the relationship between the physician and patient and the respective role of any other health care provider with respect to management of the patient; and (2) notified that he or she may decline to receive medical services by telemedicine and may withdraw from such care at any time.    Notes:     Subjective:      Patient ID: Alicia Soliz is a 41 y.o. female.    Chief Complaint: No chief complaint on file.      Patient reports frontal headache which wraps around to the back of her neck, has had this for a week now. She is also having intermittent vertigo. She denies coughing, SOB.     Review of Systems   Constitutional: Positive for activity change. Negative for unexpected weight change.   HENT: Negative for hearing loss, rhinorrhea and trouble swallowing.    Eyes: Negative for discharge and visual disturbance.   Respiratory: Negative for chest tightness and wheezing.    Cardiovascular: Negative for chest pain and palpitations.   Gastrointestinal: Negative for blood in stool, constipation, diarrhea and vomiting.   Endocrine: Negative for polydipsia and polyuria.   Genitourinary: Negative for difficulty urinating, dysuria, hematuria and menstrual problem.   Musculoskeletal: Positive for arthralgias and  neck pain. Negative for joint swelling.   Neurological: Positive for weakness and headaches.   Psychiatric/Behavioral: Negative for confusion and dysphoric mood.     Past Medical History:   Diagnosis Date    Anemia     Arthritis     Cardiac arrest as complication of care     pt states she went into cardiac arrest from an allergic reaction to a medication    Depression     Encounter for blood transfusion     Hemiplegia due to old stroke     Hypertension     Morbid obesity with BMI of 45.0-49.9, adult 9/20/2018    Multiple sclerosis           Past Surgical History:   Procedure Laterality Date    APPENDECTOMY      CHOLECYSTECTOMY      ESOPHAGOGASTRODUODENOSCOPY N/A 2/19/2020    Procedure: EGD (ESOPHAGOGASTRODUODENOSCOPY);  Surgeon: Michael Navarrete MD;  Location: [facility] ENDO;  Service: Endoscopy;  Laterality: N/A;    ESOPHAGOGASTRODUODENOSCOPY N/A 2/20/2020    Procedure: EGD (ESOPHAGOGASTRODUODENOSCOPY);  Surgeon: Michael Navarrete MD;  Location: [facility] OR;  Service: General;  Laterality: N/A;    HYSTERECTOMY      KNEE SURGERY      ROBOT-ASSISTED LAPAROSCOPIC SLEEVE GASTRECTOMY USING DA ANTHONY XI N/A 2/20/2020    Procedure: XI ROBOTIC SLEEVE GASTRECTOMY;  Surgeon: Michael Navarrete MD;  Location: [facility] OR;  Service: General;  Laterality: N/A;    TONSILLECTOMY      TUBAL LIGATION       Family History   Problem Relation Age of Onset    Lupus Mother     Heart disease Mother     Diabetes Father     Kidney disease Father     Cancer Maternal Aunt 40        breast    Breast cancer Maternal Aunt     Diabetes Maternal Aunt     Breast cancer Maternal Cousin     Breast cancer Maternal Cousin     Ovarian cancer Maternal Cousin      Social History     Socioeconomic History    Marital status: Single     Spouse name: Not on file    Number of children: 3    Years of education: Not on file    Highest education level: Not on file   Occupational History     Employer: [employer]   Social Needs    Financial resource strain: Somewhat  "hard    Food insecurity     Worry: Often true     Inability: Sometimes true    Transportation needs     Medical: Yes     Non-medical: No   Tobacco Use    Smoking status: Never Smoker    Smokeless tobacco: Never Used   Substance and Sexual Activity    Alcohol use: Yes     Frequency: Never     Drinks per session: Patient refused     Binge frequency: Never     Comment: occasion    Drug use: No    Sexual activity: Yes     Partners: Male     Birth control/protection: Surgical   Lifestyle    Physical activity     Days per week: 7 days     Minutes per session: 60 min    Stress: Not at all   Relationships    Social connections     Talks on phone: Once a week     Gets together: More than three times a week     Attends Temple service: Not on file     Active member of club or organization: Patient refused     Attends meetings of clubs or organizations: Never     Relationship status: Patient refused   Other Topics Concern    Patient feels they ought to cut down on drinking/drug use Not Asked    Patient annoyed by others criticizing their drinking/drug use Not Asked    Patient has felt bad or guilty about drinking/drug use Not Asked    Patient has had a drink/used drugs as an eye opener in the AM Not Asked   Social History Narrative    Not on file     Review of patient's allergies indicates:   Allergen Reactions    Demerol [meperidine] Itching     Other reaction(s): Itching    Dilaudid [hydromorphone (bulk)] Anaphylaxis     "coded" per pt  Patient can tolerate Lortab    Prednisone Itching     Other reaction(s): Itching    Morphine     Tramadol      shaking       Objective:       There were no vitals taken for this visit.  Physical Exam  Constitutional:       General: She is not in acute distress.     Appearance: Normal appearance. She is well-developed. She is not ill-appearing or diaphoretic.   Pulmonary:      Effort: No respiratory distress.   Neurological:      Mental Status: She is alert and oriented " to person, place, and time.   Psychiatric:         Mood and Affect: Mood normal.         Behavior: Behavior normal.         Thought Content: Thought content normal.         Judgment: Judgment normal.       Assessment:     1. Sinusitis, unspecified chronicity, unspecified location    2. Sinus headache      Plan:   Sinusitis, unspecified chronicity, unspecified location    Sinus headache    Other orders  -     azithromycin (Z-ZEYNEP) 250 MG tablet; Take 2 tablets by mouth on day 1; Take 1 tablet by mouth on days 2-5  Dispense: 6 tablet; Refill: 0  -     cetirizine (ZYRTEC) 10 MG tablet; Take 1 tablet (10 mg total) by mouth once daily.  Dispense: 30 tablet; Refill: 2  -     butalbital-acetaminophen-caffeine -40 mg (FIORICET, ESGIC) -40 mg per tablet; Take 1 tablet by mouth every 4 (four) hours as needed for Pain.  Dispense: 20 tablet; Refill: 0    Patient to contact us if pain persists or worsens.     Medication List with Changes/Refills   New Medications    AZITHROMYCIN (Z-ZEYNEP) 250 MG TABLET    Take 2 tablets by mouth on day 1; Take 1 tablet by mouth on days 2-5    BUTALBITAL-ACETAMINOPHEN-CAFFEINE -40 MG (FIORICET, ESGIC) -40 MG PER TABLET    Take 1 tablet by mouth every 4 (four) hours as needed for Pain.    CETIRIZINE (ZYRTEC) 10 MG TABLET    Take 1 tablet (10 mg total) by mouth once daily.   Current Medications    AUBAGIO 7 MG TAB    TAKE 1 TABLET (7MG) BY MOUTH ONCE DAILY    DALFAMPRIDINE (AMPYRA) 10 MG TB12    Take 1 tablet by mouth 2 (two) times daily.    DOXEPIN (SINEQUAN) 50 MG CAPSULE    Take 1 capsule (50 mg total) by mouth nightly as needed. TAKE 1 CAPSULE BY MOUTH NIGHTLY AS NEEDED.    GABAPENTIN (NEURONTIN) 300 MG CAPSULE    Take 300mg in morning, 300mg in afternoon, and 900mg at night    HYDROCHLOROTHIAZIDE (HYDRODIURIL) 12.5 MG TAB    TAKE 1 TABLET (12.5 MG TOTAL) BY MOUTH ONCE DAILY.    HYDROCODONE-ACETAMINOPHEN (HYCET) SOLUTION 7.5-325 MG/15ML    Take 15 mLs by mouth every 6 (six)  hours as needed for Pain.    HYDROCODONE-ACETAMINOPHEN (NORCO) 7.5-325 MG PER TABLET    Take 1/2 to 1 tab QD PRN pain    HYDROXYZINE HCL (ATARAX) 25 MG TABLET    Take 1 tablet (25 mg total) by mouth 3 (three) times daily as needed for Itching.    MULTIVITAMIN WITH FOLIC ACID 400 MCG TAB    Take 1 tablet by mouth.    MUPIROCIN (BACTROBAN) 2 % OINTMENT    Apply topically 2 (two) times daily. Apply to affected area    NOZASEPTIN (NOZIN) NASAL     1 each by Each Nostril route 2 (two) times daily.    NYSTATIN (MYCOSTATIN) CREAM    Apply topically 2 (two) times daily.    PROMETHAZINE (PHENERGAN) 25 MG TABLET    Take 1 tablet (25 mg total) by mouth every 6 (six) hours as needed for Nausea.    TRIAMCINOLONE ACETONIDE 0.1% (KENALOG) 0.1 % OINTMENT    Apply topically 2 (two) times daily.      Alexandre Scott (Resident)

## 2024-12-22 ENCOUNTER — EMERGENCY (EMERGENCY)
Facility: HOSPITAL | Age: 69
LOS: 1 days | Discharge: HOME | End: 2024-12-22
Attending: EMERGENCY MEDICINE | Admitting: EMERGENCY MEDICINE
Payer: MEDICARE

## 2024-12-22 VITALS
TEMPERATURE: 98 F | RESPIRATION RATE: 16 BRPM | DIASTOLIC BLOOD PRESSURE: 50 MMHG | HEIGHT: 68 IN | OXYGEN SATURATION: 98 % | HEART RATE: 87 BPM | SYSTOLIC BLOOD PRESSURE: 138 MMHG | WEIGHT: 164.02 LBS

## 2024-12-22 PROBLEM — R01.1 CARDIAC MURMUR, UNSPECIFIED: Chronic | Status: ACTIVE | Noted: 2024-12-12

## 2024-12-22 PROBLEM — I10 ESSENTIAL (PRIMARY) HYPERTENSION: Chronic | Status: ACTIVE | Noted: 2024-12-12

## 2024-12-22 PROBLEM — K40.90 UNILATERAL INGUINAL HERNIA, WITHOUT OBSTRUCTION OR GANGRENE, NOT SPECIFIED AS RECURRENT: Chronic | Status: ACTIVE | Noted: 2024-12-12

## 2024-12-22 PROBLEM — M19.90 UNSPECIFIED OSTEOARTHRITIS, UNSPECIFIED SITE: Chronic | Status: ACTIVE | Noted: 2024-12-12

## 2024-12-22 PROCEDURE — 99284 EMERGENCY DEPT VISIT MOD MDM: CPT

## 2024-12-22 NOTE — ED PROVIDER NOTE - CLINICAL SUMMARY MEDICAL DECISION MAKING FREE TEXT BOX
68 y/o male who has a past medical history of  HTN Hypothyroidism, BPH, Aortic aneurysm, heart murmur, urethral stricture (h/o urethral dilation about 6 times in the past), C-diff infection (resolved, tested negative in 10/2024) PTED s/p right inguinal hernia repair on 12/19 with Dr Fulton with post op pain and swelling  VSS  PE as documented  Plan: examined with surgery resident at bedside swelling and discomfort present but no signs of infection gangrene or obstruction has close f/u with Dr Fulton; to be d/iza

## 2024-12-22 NOTE — ED PROVIDER NOTE - OBJECTIVE STATEMENT
68 y/o male who has a past medical history of  HTN Hypothyroidism, BPH, Aortic aneurysm, heart murmur, urethral stricture (h/o urethral dilation about 6 times in the past), C-diff infection (resolved, tested negative in 10/2024) PTED s/p right inguinal hernia repair on 12/19 with Dr Fulton with post op pain and swelling

## 2024-12-22 NOTE — ED PROVIDER NOTE - CARE PROVIDER_API CALL
Yifan Fulton  Surgery  [address], [address]  [place] [address]  Phone: [phone]  Fax: [phone]  Established Patient  Follow Up Time: 1-3 Days

## 2024-12-22 NOTE — ED PROVIDER NOTE - PATIENT PORTAL LINK FT
You can access the FollowMyHealth Patient Portal offered by [facility] by registering at the following website: [email or web address] By joining [employer]’s FollowMyHealth portal, you will also be able to view your health information using other applications (apps) compatible with our system.

## 2024-12-22 NOTE — ED ADULT TRIAGE NOTE - CHIEF COMPLAINT QUOTE
pt had hernia return on Thursday  c/o swelling to scrotal area and bruising pt wants to be sure its ok

## 2024-12-22 NOTE — CONSULT NOTE ADULT - SUBJECTIVE AND OBJECTIVE BOX
SURGERY CONSULT NOTE  --------------------------------------------------------------------------------------------  Patient is a 69y old  Male who presents with a chief complaint of scrotal swelling    HPI: HPI:  70 y/o male PMH  Hypothyroidism, HTN, BPH, Aortic aneurysm, heart murmur, urethral stricture (h/o urethral dilation about 6 times in the past), C-diff infection (resolved, tested negative in 10/2024) presents to presurgical testing with diagnosis of unilateral inguinal hernia without obstruction or gangrene not recurrent. Pt with right inguinal discomfort. Pt is scheduled for right inguinal hernia repair.     ROS: 10-system review is otherwise negative except HPI above.      PAST MEDICAL & SURGICAL HISTORY:  BPH (Benign Prostatic Hypertrophy)      Hypothyroid      BPH (benign prostatic hyperplasia)      H/O aortic aneurysm      Right inguinal hernia      HTN (hypertension)      Heart murmur      Osteoarthritis      Total Hip Replacement  right      History of Back Surgery  lumbar      S/P Inguinal Hernia Repair      History of Urethral Stricture        FAMILY HISTORY:      SOCIAL HISTORY:      ALLERGIES: No Known Allergies      HOME MEDICATIONS:     CURRENT MEDICATIONS  MEDICATIONS (STANDING):   MEDICATIONS (PRN):  --------------------------------------------------------------------------------------------    Vitals:   T(C): 36.7 (12-22-24 @ 13:15), Max: 36.7 (12-22-24 @ 13:15)  HR: 87 (12-22-24 @ 13:15) (87 - 87)  BP: 138/50 (12-22-24 @ 13:15) (138/50 - 138/50)  RR: 16 (12-22-24 @ 13:15) (16 - 16)  SpO2: 98% (12-22-24 @ 13:15) (98% - 98%)  CAPILLARY BLOOD GLUCOSE        CAPILLARY BLOOD GLUCOSE          Height (cm): 172.7 (12-22 @ 13:15)  Weight (kg): 74.4 (12-22 @ 13:15)  BMI (kg/m2): 24.9 (12-22 @ 13:15)  BSA (m2): 1.88 (12-22 @ 13:15)    PHYSICAL EXAM:   General: NAD, Lying in bed comfortably  Neuro: A+Ox3  HEENT: NC/AT, EOMI  Neck: Soft, supple  Cardio: RRR  Resp: Good effort  GI/Abd: Soft, NT/ND, no rebound/guarding, no masses palpated. scrotal swelling observed.  Skin: Intact, no breakdown  Musculoskeletal: All 4 extremities moving spontaneously, no limitations.  --------------------------------------------------------------------------------------------    LABS                --------------------------------------------------------------------------------------------    MICROBIOLOGY      --------------------------------------------------------------------------------------------    IMAGING

## 2024-12-22 NOTE — ED PROVIDER NOTE - GASTROINTESTINAL, MLM
Abdomen soft, non-tender, no guarding. Rt IHR site clean and dry but not warm, erythematous  some scrotal swelling

## 2024-12-22 NOTE — ED PROVIDER NOTE - NSFOLLOWUPINSTRUCTIONS_ED_ALL_ED_FT
After An Inguinal Hernia Repair    AMBULATORY CARE:    Call your local emergency number (911 in the [place]) for any of the following:    You feel lightheaded, short of breath, and have chest pain.    You cough up blood.    You have trouble breathing.  Seek care immediately if:    Your arm or leg feels warm, tender, and painful. It may look swollen and red.    Blood soaks through your bandage.    Your abdomen or groin feels hard and looks bigger than usual.    Your bruise suddenly gets bigger.    Your bowel movements are black, bloody, or tarry-looking.  Call your doctor if:    You have a fever above 101°F.    You develop a skin rash, hives, or itching.    Your incision is swollen, red, or draining pus or fluid.    You have nausea, or you are vomiting.    You cannot have a bowel movement.    You have trouble urinating.    Your pain does not get better after you take pain medicine.    You have questions or concerns about your condition or care.  Medicines: You may need any of the following:    Prescription pain medicine may be given. Ask your healthcare provider how to take this medicine safely. Some prescription pain medicines contain acetaminophen. Do not take other medicines that contain acetaminophen without talking to your healthcare provider. Too much acetaminophen may cause liver damage. Prescription pain medicine may cause constipation. Ask your healthcare provider how to prevent or treat constipation.    NSAIDs, such as ibuprofen, help decrease swelling, pain, and fever. This medicine is available with or without a doctor's order. NSAIDs can cause stomach bleeding or kidney problems in certain people. If you take blood thinner medicine, always ask your healthcare provider if NSAIDs are safe for you. Always read the medicine label and follow directions.    Acetaminophen decreases pain and fever. It is available without a doctor's order. Ask how much to take and how often to take it. Follow directions. Read the labels of all other medicines you are using to see if they also contain acetaminophen, or ask your doctor or pharmacist. Acetaminophen can cause liver damage if not taken correctly.    Take your medicine as directed. Contact your healthcare provider if you think your medicine is not helping or if you have side effects. Tell your provider if you are allergic to any medicine. Keep a list of the medicines, vitamins, and herbs you take. Include the amounts, and when and why you take them. Bring the list or the pill bottles to follow-up visits. Carry your medicine list with you in case of an emergency.  Bathing: You can shower in 48 hours. Remove your bandage before you shower. It is normal to see a small amount of blood under the bandage. Carefully wash around your wound. It is okay to let soap and water run over your wound. Do not scrub your wound. Gently pat your wound dry.    Care for your wound as directed: If you have strips of medical tape over your incision, allow them to fall off on their own. It may take 7 to 10 days for them to fall off. Do not put powders, lotions, or creams on your wound. They may cause your wound to get infected. Do not get in a bathtub, swimming pool, or hot tub until your healthcare provider says it is okay. Check your wound every day for signs of infection, such as redness, swelling, or pus. Bruising is normal and expected. Men may have bruising and swelling in the scrotum.    Take deep breaths and cough: Do this 10 times each hour. This will decrease your risk for a lung infection. Take a deep breath and hold it for as long as you can. Let the air out and then cough strongly. Deep breaths help open your airway. You may be given an incentive spirometer to help you take deep breaths. Put the plastic piece in your mouth and take a slow, deep breath. Then let the air out and cough. Repeat these steps 10 times every hour.    Use a pillow as a splint: Press a pillow lightly against your incision when you cough, move, or get out of bed. This may decrease pain or discomfort. You may need another person to help you get in and out of bed, a chair, or off the toilet.    Activity: Get out of bed and walk the day after your surgery. This will help prevent blood clots, move your bowels after surgery, and increase healing. Start with short walks around the house. Gradually walk further each day. Do not play sports for 2 to 3 weeks. Do not lift anything heavier than 5 pounds until your healthcare provider says it is okay. This may put too much pressure on your incision and cause it to come apart. It may also increase your risk for another hernia.    Drink liquids as directed: Liquids may prevent constipation and straining during a bowel movement. This will help prevent pressure on your incision, and prevent another hernia. Ask how much liquid to drink each day and which liquids are best for you.    Decrease swelling:    Apply ice on your incision for 15 to 20 minutes every hour or as directed. Use an ice pack, or put crushed ice in a plastic bag. Cover it with a towel. Ice helps prevent tissue damage and decreases swelling and pain.    Elevate your scrotum on a towel. Lie in bed. Roll a small towel and place it under your scrotum. This will help decrease swelling and bruising.  Driving: Do not drive for at least 1 week after surgery. Do not drive if you are taking prescription pain medication. Do not drive until it is comfortable to wear a seatbelt across your abdomen. Ask your healthcare provider when it is safe for you to drive.    Return to work or school: You may be able to return to work or school in 48 to 72 hours. You may need to stay out of work if longer you have to lift heavy items at work.    Do not smoke: Nicotine and other chemicals in cigarettes and cigars can prevent your wound from healing. It can also increase your risk for another inguinal hernia. Ask your healthcare provider for information if you currently smoke and need help to quit. E-cigarettes or smokeless tobacco still contain nicotine. Talk to your healthcare provider before you use these products.    Follow up with your doctor as directed: Write down your questions so you remember to ask them during your visits.

## 2024-12-22 NOTE — CONSULT NOTE ADULT - ASSESSMENT
A 69 year old male s/p open inguinal hernia repair 12/19 with mesh presented to the ED with scrotal swelling.    Recommendations:  - Appropriate post operative reaction.  - Discharge patient home    Discussed with Dr. Donaldo YANEZ-Team  [id number]

## 2024-12-23 RX ORDER — OXYCODONE HYDROCHLORIDE 30 MG/1
1 TABLET ORAL
Qty: 12 | Refills: 0
Start: 2024-12-23

## 2024-12-27 LAB — SURGICAL PATHOLOGY STUDY: SIGNIFICANT CHANGE UP

## 2025-01-09 DIAGNOSIS — A04.72 ENTEROCOLITIS DUE TO CLOSTRIDIUM DIFFICILE, NOT SPECIFIED AS RECURRENT: ICD-10-CM

## 2025-01-15 LAB — GI PCR PANEL: NOT DETECTED

## 2025-01-17 LAB
BACTERIA STL CULT: NORMAL
C DIFF TOXIN B QL PCR REFLEX: NORMAL
GDH ANTIGEN: NOT DETECTED
TOXIN A AND B: NOT DETECTED

## 2025-03-05 NOTE — H&P CARDIOLOGY - NSICDXPASTMEDICALHX_GEN_ALL_CORE_FT
Patient called clinic back, relayed information on conservative treatment for pain management. Patient expressed understanding and agreeable with plan. No further questions or concerns at this time.     Patient requesting to schedule appointment, per last office visit, patient advised to follow up in 5 months, appointment scheduled as advised.   PAST MEDICAL HISTORY:  BPH (benign prostatic hyperplasia)     BPH (Benign Prostatic Hypertrophy)     HTN (Hypertension) has been high on recent doctor visits, never started on meds    Hypothyroid      PAST MEDICAL HISTORY:  BPH (benign prostatic hyperplasia)     BPH (Benign Prostatic Hypertrophy)     H/O aortic aneurysm     HTN (Hypertension) has been high on recent doctor visits, never started on meds    Hypothyroid

## 2025-03-31 ENCOUNTER — RX RENEWAL (OUTPATIENT)
Age: 70
End: 2025-03-31

## 2025-05-13 ENCOUNTER — NON-APPOINTMENT (OUTPATIENT)
Age: 70
End: 2025-05-13

## 2025-05-28 ENCOUNTER — NON-APPOINTMENT (OUTPATIENT)
Age: 70
End: 2025-05-28

## 2025-07-08 ENCOUNTER — LABORATORY RESULT (OUTPATIENT)
Age: 70
End: 2025-07-08

## 2025-07-09 ENCOUNTER — LABORATORY RESULT (OUTPATIENT)
Age: 70
End: 2025-07-09

## 2025-07-14 LAB
GI PCR PANEL: NOT DETECTED
H PYLORI AG STL QL: NEGATIVE
HEMOCCULT STL QL IA: NEGATIVE

## 2025-07-15 LAB
BACTERIA STL CULT: NORMAL
C DIFF TOXIN B QL PCR REFLEX: NORMAL
GDH ANTIGEN: DETECTED
TOXIN A AND B: NOT DETECTED

## 2025-07-16 RX ORDER — FIDAXOMICIN 200 MG/1
200 TABLET, FILM COATED ORAL TWICE DAILY
Qty: 20 | Refills: 0 | Status: ACTIVE | COMMUNITY
Start: 2025-07-16 | End: 1900-01-01

## 2025-07-17 ENCOUNTER — NON-APPOINTMENT (OUTPATIENT)
Age: 70
End: 2025-07-17

## 2025-07-18 ENCOUNTER — NON-APPOINTMENT (OUTPATIENT)
Age: 70
End: 2025-07-18

## 2025-07-18 LAB — CALPROTECTIN FECAL: 66 UG/G

## 2025-07-30 ENCOUNTER — APPOINTMENT (OUTPATIENT)
Dept: GASTROENTEROLOGY | Facility: CLINIC | Age: 70
End: 2025-07-30

## 2025-07-31 RX ORDER — ONDANSETRON 4 MG/1
4 TABLET, ORALLY DISINTEGRATING ORAL
Qty: 45 | Refills: 3 | Status: ACTIVE | COMMUNITY
Start: 2025-07-31 | End: 1900-01-01

## 2025-08-08 ENCOUNTER — APPOINTMENT (OUTPATIENT)
Dept: INFECTIOUS DISEASE | Facility: CLINIC | Age: 70
End: 2025-08-08
Payer: MEDICARE

## 2025-08-08 VITALS
OXYGEN SATURATION: 96 % | DIASTOLIC BLOOD PRESSURE: 67 MMHG | TEMPERATURE: 98.2 F | BODY MASS INDEX: 30.63 KG/M2 | SYSTOLIC BLOOD PRESSURE: 155 MMHG | HEIGHT: 60 IN | HEART RATE: 58 BPM | WEIGHT: 156 LBS

## 2025-08-08 DIAGNOSIS — A04.72 ENTEROCOLITIS DUE TO CLOSTRIDIUM DIFFICILE, NOT SPECIFIED AS RECURRENT: ICD-10-CM

## 2025-08-08 PROCEDURE — 99205 OFFICE O/P NEW HI 60 MIN: CPT

## 2025-08-08 RX ORDER — ONDANSETRON 4 MG/1
4 TABLET, ORALLY DISINTEGRATING ORAL
Qty: 45 | Refills: 1 | Status: ACTIVE | COMMUNITY
Start: 2025-08-08 | End: 1900-01-01

## 2025-08-19 ENCOUNTER — APPOINTMENT (OUTPATIENT)
Dept: ORTHOPEDIC SURGERY | Facility: CLINIC | Age: 70
End: 2025-08-19

## 2025-08-20 ENCOUNTER — APPOINTMENT (OUTPATIENT)
Dept: PULMONOLOGY | Facility: CLINIC | Age: 70
End: 2025-08-20
Payer: MEDICARE

## 2025-08-20 VITALS
TEMPERATURE: 98.1 F | DIASTOLIC BLOOD PRESSURE: 56 MMHG | OXYGEN SATURATION: 98 % | HEART RATE: 56 BPM | WEIGHT: 156 LBS | BODY MASS INDEX: 23.64 KG/M2 | HEIGHT: 68 IN | SYSTOLIC BLOOD PRESSURE: 164 MMHG | RESPIRATION RATE: 17 BRPM

## 2025-08-20 DIAGNOSIS — R93.89 ABNORMAL FINDINGS ON DIAGNOSTIC IMAGING OF OTHER SPECIFIED BODY STRUCTURES: ICD-10-CM

## 2025-08-20 DIAGNOSIS — J84.9 INTERSTITIAL PULMONARY DISEASE, UNSPECIFIED: ICD-10-CM

## 2025-08-20 LAB — HEMOGLOBIN: 13.5

## 2025-08-20 PROCEDURE — 94729 DIFFUSING CAPACITY: CPT

## 2025-08-20 PROCEDURE — 94010 BREATHING CAPACITY TEST: CPT

## 2025-08-20 PROCEDURE — 94727 GAS DIL/WSHOT DETER LNG VOL: CPT

## 2025-08-20 PROCEDURE — 99214 OFFICE O/P EST MOD 30 MIN: CPT | Mod: 25

## 2025-08-20 PROCEDURE — ZZZZZ: CPT

## (undated) DEVICE — DRSG STERISTRIPS 0.5 X 4"

## (undated) DEVICE — ELCTR BOVIE TIP BLADE INSULATED 4" EDGE

## (undated) DEVICE — GLV 7.5 PROTEXIS (WHITE)

## (undated) DEVICE — SPONGE DISSECTOR PEANUT

## (undated) DEVICE — ELCTR ROCKER SWITCH PENCIL BLUE 10FT

## (undated) DEVICE — POSITIONER PATIENT SAFETY STRAP 3X60"

## (undated) DEVICE — DRAPE INSTRUMENT POUCH 6.75" X 11"

## (undated) DEVICE — DRSG MASTISOL

## (undated) DEVICE — DRAPE 3/4 SHEET 52X76"

## (undated) DEVICE — DRAIN PENROSE .25" X 12" SILICONE

## (undated) DEVICE — BLADE SURGICAL #15 CARBON

## (undated) DEVICE — PACK MINOR NO DRAPE

## (undated) DEVICE — SOL IRR POUR H2O 500ML

## (undated) DEVICE — SUT MONOCRYL PLUS 4-0 18" PS-2 UNDYED

## (undated) DEVICE — GOWN XL

## (undated) DEVICE — SUT VICRYL 2-0 27" SH UNDYED

## (undated) DEVICE — ELCTR GROUNDING PAD ADULT COVIDIEN

## (undated) DEVICE — DRAPE TOWEL BLUE 17" X 24"

## (undated) DEVICE — VENODYNE/SCD SLEEVE CALF MEDIUM

## (undated) DEVICE — SUT PROLENE 0 30" CT-1

## (undated) DEVICE — LAP PAD W RING 18 X 18"

## (undated) DEVICE — SOL IRR POUR NS 0.9% 500ML

## (undated) DEVICE — POSITIONER FOAM EGG CRATE ULNAR 2PCS (PINK)

## (undated) DEVICE — PREP CHLORAPREP HI-LITE ORANGE 26ML

## (undated) DEVICE — SUT PROLENE 1 30" CT-1

## (undated) DEVICE — DRSG TELFA 3 X 8

## (undated) DEVICE — SUT VICRYL PLUS 2-0 18" TIES UNDYED

## (undated) DEVICE — WARMING BLANKET UPPER ADULT

## (undated) DEVICE — DRAPE LAPAROTOMY TRANSVERSE